# Patient Record
Sex: FEMALE | Race: WHITE | NOT HISPANIC OR LATINO | Employment: FULL TIME | URBAN - METROPOLITAN AREA
[De-identification: names, ages, dates, MRNs, and addresses within clinical notes are randomized per-mention and may not be internally consistent; named-entity substitution may affect disease eponyms.]

---

## 2018-09-13 ENCOUNTER — APPOINTMENT (OUTPATIENT)
Dept: WOMENS IMAGING | Facility: HOSPITAL | Age: 43
End: 2018-09-13

## 2018-09-13 PROCEDURE — 76641 ULTRASOUND BREAST COMPLETE: CPT | Performed by: RADIOLOGY

## 2020-10-07 ENCOUNTER — OFFICE VISIT (OUTPATIENT)
Dept: SURGERY | Facility: CLINIC | Age: 45
End: 2020-10-07

## 2020-10-07 VITALS — HEART RATE: 75 BPM | SYSTOLIC BLOOD PRESSURE: 136 MMHG | DIASTOLIC BLOOD PRESSURE: 94 MMHG | WEIGHT: 178 LBS

## 2020-10-07 DIAGNOSIS — D24.1 FIBROADENOMA OF RIGHT BREAST: Primary | ICD-10-CM

## 2020-10-07 DIAGNOSIS — Z01.818 PREOP TESTING: Primary | ICD-10-CM

## 2020-10-07 PROCEDURE — 99203 OFFICE O/P NEW LOW 30 MIN: CPT | Performed by: SURGERY

## 2020-10-07 RX ORDER — FENOFIBRATE 145 MG/1
145 TABLET, COATED ORAL DAILY
COMMUNITY
Start: 2020-09-30

## 2020-10-07 RX ORDER — GABAPENTIN 300 MG/1
CAPSULE ORAL
COMMUNITY
Start: 2020-09-30

## 2020-10-07 RX ORDER — IRBESARTAN 150 MG/1
150 TABLET ORAL DAILY
COMMUNITY
Start: 2020-09-30

## 2020-10-07 RX ORDER — LEVOTHYROXINE SODIUM 0.07 MG/1
125 TABLET ORAL DAILY
COMMUNITY
Start: 2020-09-30

## 2020-10-07 RX ORDER — SEMAGLUTIDE 1.34 MG/ML
1 INJECTION, SOLUTION SUBCUTANEOUS WEEKLY
COMMUNITY
Start: 2020-10-02 | End: 2023-03-15

## 2020-10-07 RX ORDER — IRON POLYSACCHARIDE COMPLEX 150 MG
150 CAPSULE ORAL DAILY PRN
COMMUNITY
Start: 2020-07-28

## 2020-10-07 RX ORDER — ERGOCALCIFEROL 1.25 MG/1
50000 CAPSULE ORAL DAILY
COMMUNITY

## 2020-10-07 RX ORDER — EMPAGLIFLOZIN 25 MG/1
1 TABLET, FILM COATED ORAL DAILY
COMMUNITY
Start: 2020-09-30

## 2020-10-07 NOTE — PROGRESS NOTES
Subjective   Faby Avelar is a 44 y.o. female is being seen for consultation today at the request of Provider, No Known    Faby Avelar is a 44 y.o. female With biopsy-proven fibroadenoma the noninterventional imaging has increased in size from 1.3 to 1.9 cm.  Due to increase in size and discomfort she would like to undergo excisional biopsy.  No findings concerning for conversion or new mass.  The patient has no nipple discharge or other abnormality.  No previous pregnancies.  No evidence of early menarche and she is premenopausal.  No hormone therapy reported.      Past Medical History:   Diagnosis Date   • Diabetes mellitus (CMS/HCC)    • Hypertension    • Low iron    • Thyroid activity decreased        Family History   Problem Relation Age of Onset   • COPD Mother    • Hypertension Mother    • Heart disease Father    • Diabetes Father    • Hyperlipidemia Father        Social History     Socioeconomic History   • Marital status: Unknown     Spouse name: Not on file   • Number of children: Not on file   • Years of education: Not on file   • Highest education level: Not on file   Tobacco Use   • Smoking status: Never Smoker   • Smokeless tobacco: Never Used   Substance and Sexual Activity   • Alcohol use: Never     Frequency: Never   • Drug use: Never       Past Surgical History:   Procedure Laterality Date   • GALLBLADDER SURGERY     • WRIST SURGERY         Review of Systems   Constitutional: Negative for activity change, appetite change, chills and fever.   HENT: Negative for sore throat and trouble swallowing.    Eyes: Negative for visual disturbance.   Respiratory: Negative for cough and shortness of breath.    Cardiovascular: Negative for chest pain and palpitations.   Gastrointestinal: Negative for abdominal distention, abdominal pain, blood in stool, constipation, diarrhea, nausea and vomiting.   Endocrine: Negative for cold intolerance and heat intolerance.   Genitourinary: Negative for dysuria.    Musculoskeletal: Negative for joint swelling.   Skin: Negative for color change, rash and wound.   Allergic/Immunologic: Negative for immunocompromised state.   Neurological: Negative for dizziness, seizures, weakness and headaches.   Hematological: Negative for adenopathy. Does not bruise/bleed easily.   Psychiatric/Behavioral: Negative for agitation and confusion.         /94   Pulse 75   Wt 80.7 kg (178 lb)   Objective   Physical Exam  Vitals signs reviewed.   Constitutional:       Appearance: She is well-developed.   HENT:      Head: Normocephalic and atraumatic.   Eyes:      General: No scleral icterus.     Conjunctiva/sclera: Conjunctivae normal.      Pupils: Pupils are equal, round, and reactive to light.   Neck:      Musculoskeletal: Neck supple.      Thyroid: No thyromegaly.      Vascular: No JVD.      Trachea: No tracheal deviation.   Cardiovascular:      Rate and Rhythm: Normal rate and regular rhythm.      Heart sounds: No murmur. No friction rub. No gallop.    Pulmonary:      Effort: Pulmonary effort is normal. No retractions.      Breath sounds: Normal breath sounds.   Chest:      Chest wall: No tenderness.   Abdominal:      General: Bowel sounds are normal. There is no distension.      Palpations: Abdomen is soft. There is no hepatomegaly, splenomegaly or mass.      Tenderness: There is no abdominal tenderness.      Hernia: No hernia is present.   Musculoskeletal: Normal range of motion.         General: No deformity.   Lymphadenopathy:      Cervical: No cervical adenopathy.   Skin:     General: Skin is warm and dry.      Findings: No rash.   Neurological:      Mental Status: She is alert and oriented to person, place, and time.               Assessment   Faby was seen today for breast mass.    Diagnoses and all orders for this visit:    Fibroadenoma of right breast  -     Case Request; Standing  -     Mammo Breast Placement Device Initial Without Biopsy Right; Future  -     Case  Request    Other orders  -     SCANNED - IMAGING  -     SCANNED - IMAGING  -     SCANNED - IMAGING  -     SCANNED - IMAGING  -     SCANNED - IMAGING  -     SCANNED - IMAGING  -     SCANNED - IMAGING  -     SCANNED - IMAGING  -     SCANNED - IMAGING  -     Follow anesthesia standing orders.  -     Provide NPO Instructions to Patient; Future  -     Chlorhexidine Skin Prep; Future      Faby Avelar is a 44 y.o. female with growing fibroadenoma of the right breast causing pain.  Risk and benefits have been discussed with the patient and she will proceed with excisional biopsy after wire localization.    Patient's There is no height or weight on file to calculate BMI. BMI is above normal parameters. Recommendations include: educational material.

## 2020-10-27 ENCOUNTER — APPOINTMENT (OUTPATIENT)
Dept: PREADMISSION TESTING | Facility: HOSPITAL | Age: 45
End: 2020-10-27

## 2020-10-27 ENCOUNTER — LAB (OUTPATIENT)
Dept: LAB | Facility: HOSPITAL | Age: 45
End: 2020-10-27

## 2020-10-27 DIAGNOSIS — Z01.818 PREOP TESTING: ICD-10-CM

## 2020-10-27 LAB
ANION GAP SERPL CALCULATED.3IONS-SCNC: 10.6 MMOL/L (ref 5–15)
BUN SERPL-MCNC: 14 MG/DL (ref 6–20)
BUN/CREAT SERPL: 18.2 (ref 7–25)
CALCIUM SPEC-SCNC: 9.6 MG/DL (ref 8.6–10.5)
CHLORIDE SERPL-SCNC: 104 MMOL/L (ref 98–107)
CO2 SERPL-SCNC: 24.4 MMOL/L (ref 22–29)
CREAT SERPL-MCNC: 0.77 MG/DL (ref 0.57–1)
DEPRECATED RDW RBC AUTO: 42.5 FL (ref 37–54)
ERYTHROCYTE [DISTWIDTH] IN BLOOD BY AUTOMATED COUNT: 16.1 % (ref 12.3–15.4)
GFR SERPL CREATININE-BSD FRML MDRD: 81 ML/MIN/1.73
GLUCOSE SERPL-MCNC: 100 MG/DL (ref 65–99)
HCT VFR BLD AUTO: 38.1 % (ref 34–46.6)
HGB BLD-MCNC: 11.1 G/DL (ref 12–15.9)
MCH RBC QN AUTO: 21.3 PG (ref 26.6–33)
MCHC RBC AUTO-ENTMCNC: 29.1 G/DL (ref 31.5–35.7)
MCV RBC AUTO: 73.3 FL (ref 79–97)
PLATELET # BLD AUTO: 340 10*3/MM3 (ref 140–450)
PMV BLD AUTO: 10.7 FL (ref 6–12)
POTASSIUM SERPL-SCNC: 4.4 MMOL/L (ref 3.5–5.2)
RBC # BLD AUTO: 5.2 10*6/MM3 (ref 3.77–5.28)
SODIUM SERPL-SCNC: 139 MMOL/L (ref 136–145)
WBC # BLD AUTO: 7.7 10*3/MM3 (ref 3.4–10.8)

## 2020-10-27 PROCEDURE — U0004 COV-19 TEST NON-CDC HGH THRU: HCPCS | Performed by: SURGERY

## 2020-10-27 PROCEDURE — C9803 HOPD COVID-19 SPEC COLLECT: HCPCS

## 2020-10-27 PROCEDURE — 36415 COLL VENOUS BLD VENIPUNCTURE: CPT

## 2020-10-27 PROCEDURE — 85027 COMPLETE CBC AUTOMATED: CPT | Performed by: ANESTHESIOLOGY

## 2020-10-27 PROCEDURE — 80048 BASIC METABOLIC PNL TOTAL CA: CPT | Performed by: ANESTHESIOLOGY

## 2020-10-28 LAB — SARS-COV-2 RNA RESP QL NAA+PROBE: NOT DETECTED

## 2020-10-29 ENCOUNTER — HOSPITAL ENCOUNTER (OUTPATIENT)
Dept: ULTRASOUND IMAGING | Facility: HOSPITAL | Age: 45
Setting detail: HOSPITAL OUTPATIENT SURGERY
Discharge: HOME | End: 2020-10-29

## 2020-10-29 ENCOUNTER — APPOINTMENT (OUTPATIENT)
Dept: MAMMOGRAPHY | Facility: HOSPITAL | Age: 45
End: 2020-10-29

## 2020-10-29 ENCOUNTER — HOSPITAL ENCOUNTER (OUTPATIENT)
Facility: HOSPITAL | Age: 45
Setting detail: HOSPITAL OUTPATIENT SURGERY
Discharge: HOME | End: 2020-10-29
Attending: SURGERY | Admitting: SURGERY

## 2020-10-29 ENCOUNTER — ANESTHESIA (OUTPATIENT)
Dept: PERIOP | Facility: HOSPITAL | Age: 45
End: 2020-10-29

## 2020-10-29 ENCOUNTER — ANESTHESIA EVENT (OUTPATIENT)
Dept: PERIOP | Facility: HOSPITAL | Age: 45
End: 2020-10-29

## 2020-10-29 VITALS
RESPIRATION RATE: 18 BRPM | WEIGHT: 179 LBS | BODY MASS INDEX: 31.71 KG/M2 | OXYGEN SATURATION: 100 % | TEMPERATURE: 97.3 F | HEART RATE: 76 BPM | HEIGHT: 63 IN | SYSTOLIC BLOOD PRESSURE: 122 MMHG | DIASTOLIC BLOOD PRESSURE: 73 MMHG

## 2020-10-29 DIAGNOSIS — R92.8 ABNORMAL MAMMOGRAM: ICD-10-CM

## 2020-10-29 DIAGNOSIS — D24.1 FIBROADENOMA OF RIGHT BREAST: ICD-10-CM

## 2020-10-29 LAB
B-HCG UR QL: NEGATIVE
GLUCOSE BLDC GLUCOMTR-MCNC: 93 MG/DL (ref 70–130)
INTERNAL NEGATIVE CONTROL: NEGATIVE
INTERNAL POSITIVE CONTROL: POSITIVE
Lab: NORMAL

## 2020-10-29 PROCEDURE — 25010000002 PROPOFOL 10 MG/ML EMULSION: Performed by: NURSE ANESTHETIST, CERTIFIED REGISTERED

## 2020-10-29 PROCEDURE — 25010000002 MIDAZOLAM PER 1 MG: Performed by: NURSE ANESTHETIST, CERTIFIED REGISTERED

## 2020-10-29 PROCEDURE — 25010000002 ONDANSETRON PER 1 MG: Performed by: NURSE ANESTHETIST, CERTIFIED REGISTERED

## 2020-10-29 PROCEDURE — 25010000002 KETOROLAC TROMETHAMINE PER 15 MG: Performed by: NURSE ANESTHETIST, CERTIFIED REGISTERED

## 2020-10-29 PROCEDURE — 77065 DX MAMMO INCL CAD UNI: CPT | Performed by: RADIOLOGY

## 2020-10-29 PROCEDURE — 19125 EXCISION BREAST LESION: CPT | Performed by: SURGERY

## 2020-10-29 PROCEDURE — 81025 URINE PREGNANCY TEST: CPT | Performed by: ANESTHESIOLOGY

## 2020-10-29 PROCEDURE — 82962 GLUCOSE BLOOD TEST: CPT

## 2020-10-29 PROCEDURE — 25010000002 DEXAMETHASONE PER 1 MG: Performed by: NURSE ANESTHETIST, CERTIFIED REGISTERED

## 2020-10-29 PROCEDURE — 19285 PERQ DEV BREAST 1ST US IMAG: CPT | Performed by: RADIOLOGY

## 2020-10-29 PROCEDURE — 25010000002 FENTANYL CITRATE (PF) 100 MCG/2ML SOLUTION: Performed by: NURSE ANESTHETIST, CERTIFIED REGISTERED

## 2020-10-29 RX ORDER — MIDAZOLAM HYDROCHLORIDE 1 MG/ML
INJECTION INTRAMUSCULAR; INTRAVENOUS AS NEEDED
Status: DISCONTINUED | OUTPATIENT
Start: 2020-10-29 | End: 2020-10-29 | Stop reason: SURG

## 2020-10-29 RX ORDER — ONDANSETRON 2 MG/ML
INJECTION INTRAMUSCULAR; INTRAVENOUS AS NEEDED
Status: DISCONTINUED | OUTPATIENT
Start: 2020-10-29 | End: 2020-10-29 | Stop reason: SURG

## 2020-10-29 RX ORDER — MAGNESIUM HYDROXIDE 1200 MG/15ML
LIQUID ORAL AS NEEDED
Status: DISCONTINUED | OUTPATIENT
Start: 2020-10-29 | End: 2020-10-29 | Stop reason: HOSPADM

## 2020-10-29 RX ORDER — SODIUM CHLORIDE 0.9 % (FLUSH) 0.9 %
10 SYRINGE (ML) INJECTION EVERY 12 HOURS SCHEDULED
Status: DISCONTINUED | OUTPATIENT
Start: 2020-10-29 | End: 2020-10-29 | Stop reason: HOSPADM

## 2020-10-29 RX ORDER — BUPIVACAINE HYDROCHLORIDE AND EPINEPHRINE 2.5; 5 MG/ML; UG/ML
INJECTION, SOLUTION EPIDURAL; INFILTRATION; INTRACAUDAL; PERINEURAL AS NEEDED
Status: DISCONTINUED | OUTPATIENT
Start: 2020-10-29 | End: 2020-10-29 | Stop reason: HOSPADM

## 2020-10-29 RX ORDER — ACETAMINOPHEN 325 MG/1
650 TABLET ORAL EVERY 4 HOURS PRN
Qty: 30 TABLET | Refills: 0 | Status: SHIPPED | OUTPATIENT
Start: 2020-10-29

## 2020-10-29 RX ORDER — FAMOTIDINE 10 MG/ML
INJECTION, SOLUTION INTRAVENOUS AS NEEDED
Status: DISCONTINUED | OUTPATIENT
Start: 2020-10-29 | End: 2020-10-29 | Stop reason: SURG

## 2020-10-29 RX ORDER — DROPERIDOL 2.5 MG/ML
0.62 INJECTION, SOLUTION INTRAMUSCULAR; INTRAVENOUS ONCE AS NEEDED
Status: DISCONTINUED | OUTPATIENT
Start: 2020-10-29 | End: 2020-10-29 | Stop reason: HOSPADM

## 2020-10-29 RX ORDER — SODIUM CHLORIDE, SODIUM LACTATE, POTASSIUM CHLORIDE, CALCIUM CHLORIDE 600; 310; 30; 20 MG/100ML; MG/100ML; MG/100ML; MG/100ML
125 INJECTION, SOLUTION INTRAVENOUS CONTINUOUS
Status: DISCONTINUED | OUTPATIENT
Start: 2020-10-29 | End: 2020-10-29 | Stop reason: HOSPADM

## 2020-10-29 RX ORDER — PROPOFOL 10 MG/ML
VIAL (ML) INTRAVENOUS AS NEEDED
Status: DISCONTINUED | OUTPATIENT
Start: 2020-10-29 | End: 2020-10-29 | Stop reason: SURG

## 2020-10-29 RX ORDER — SODIUM CHLORIDE, SODIUM LACTATE, POTASSIUM CHLORIDE, CALCIUM CHLORIDE 600; 310; 30; 20 MG/100ML; MG/100ML; MG/100ML; MG/100ML
100 INJECTION, SOLUTION INTRAVENOUS ONCE AS NEEDED
Status: DISCONTINUED | OUTPATIENT
Start: 2020-10-29 | End: 2020-10-29 | Stop reason: HOSPADM

## 2020-10-29 RX ORDER — MIDAZOLAM HYDROCHLORIDE 1 MG/ML
1 INJECTION INTRAMUSCULAR; INTRAVENOUS
Status: DISCONTINUED | OUTPATIENT
Start: 2020-10-29 | End: 2020-10-29 | Stop reason: HOSPADM

## 2020-10-29 RX ORDER — OXYCODONE HYDROCHLORIDE AND ACETAMINOPHEN 5; 325 MG/1; MG/1
1 TABLET ORAL ONCE AS NEEDED
Status: COMPLETED | OUTPATIENT
Start: 2020-10-29 | End: 2020-10-29

## 2020-10-29 RX ORDER — KETOROLAC TROMETHAMINE 30 MG/ML
INJECTION, SOLUTION INTRAMUSCULAR; INTRAVENOUS AS NEEDED
Status: DISCONTINUED | OUTPATIENT
Start: 2020-10-29 | End: 2020-10-29 | Stop reason: SURG

## 2020-10-29 RX ORDER — FENTANYL CITRATE 50 UG/ML
50 INJECTION, SOLUTION INTRAMUSCULAR; INTRAVENOUS
Status: DISCONTINUED | OUTPATIENT
Start: 2020-10-29 | End: 2020-10-29 | Stop reason: HOSPADM

## 2020-10-29 RX ORDER — IBUPROFEN 600 MG/1
600 TABLET ORAL EVERY 6 HOURS PRN
Qty: 30 TABLET | Refills: 0 | Status: SHIPPED | OUTPATIENT
Start: 2020-10-29

## 2020-10-29 RX ORDER — LIDOCAINE HYDROCHLORIDE 20 MG/ML
INJECTION, SOLUTION INFILTRATION; PERINEURAL AS NEEDED
Status: DISCONTINUED | OUTPATIENT
Start: 2020-10-29 | End: 2020-10-29 | Stop reason: SURG

## 2020-10-29 RX ORDER — TRAMADOL HYDROCHLORIDE 50 MG/1
50 TABLET ORAL EVERY 8 HOURS PRN
Qty: 6 TABLET | Refills: 0 | Status: SHIPPED | OUTPATIENT
Start: 2020-10-29

## 2020-10-29 RX ORDER — IPRATROPIUM BROMIDE AND ALBUTEROL SULFATE 2.5; .5 MG/3ML; MG/3ML
3 SOLUTION RESPIRATORY (INHALATION) ONCE AS NEEDED
Status: DISCONTINUED | OUTPATIENT
Start: 2020-10-29 | End: 2020-10-29 | Stop reason: HOSPADM

## 2020-10-29 RX ORDER — FENTANYL CITRATE 50 UG/ML
INJECTION, SOLUTION INTRAMUSCULAR; INTRAVENOUS AS NEEDED
Status: DISCONTINUED | OUTPATIENT
Start: 2020-10-29 | End: 2020-10-29 | Stop reason: SURG

## 2020-10-29 RX ORDER — DEXAMETHASONE SODIUM PHOSPHATE 4 MG/ML
INJECTION, SOLUTION INTRA-ARTICULAR; INTRALESIONAL; INTRAMUSCULAR; INTRAVENOUS; SOFT TISSUE AS NEEDED
Status: DISCONTINUED | OUTPATIENT
Start: 2020-10-29 | End: 2020-10-29 | Stop reason: SURG

## 2020-10-29 RX ORDER — MEPERIDINE HYDROCHLORIDE 25 MG/ML
12.5 INJECTION INTRAMUSCULAR; INTRAVENOUS; SUBCUTANEOUS
Status: DISCONTINUED | OUTPATIENT
Start: 2020-10-29 | End: 2020-10-29 | Stop reason: HOSPADM

## 2020-10-29 RX ORDER — SODIUM CHLORIDE 0.9 % (FLUSH) 0.9 %
10 SYRINGE (ML) INJECTION AS NEEDED
Status: DISCONTINUED | OUTPATIENT
Start: 2020-10-29 | End: 2020-10-29 | Stop reason: HOSPADM

## 2020-10-29 RX ORDER — ONDANSETRON 2 MG/ML
4 INJECTION INTRAMUSCULAR; INTRAVENOUS AS NEEDED
Status: DISCONTINUED | OUTPATIENT
Start: 2020-10-29 | End: 2020-10-29 | Stop reason: HOSPADM

## 2020-10-29 RX ADMIN — CEFAZOLIN 1 G: 1 INJECTION, POWDER, FOR SOLUTION INTRAMUSCULAR; INTRAVENOUS; PARENTERAL at 11:05

## 2020-10-29 RX ADMIN — OXYCODONE HYDROCHLORIDE AND ACETAMINOPHEN 1 TABLET: 5; 325 TABLET ORAL at 12:25

## 2020-10-29 RX ADMIN — KETOROLAC TROMETHAMINE 30 MG: 30 INJECTION, SOLUTION INTRAMUSCULAR; INTRAVENOUS at 11:12

## 2020-10-29 RX ADMIN — DEXAMETHASONE SODIUM PHOSPHATE 8 MG: 4 INJECTION, SOLUTION INTRAMUSCULAR; INTRAVENOUS at 11:12

## 2020-10-29 RX ADMIN — FENTANYL CITRATE 50 MCG: 50 INJECTION INTRAMUSCULAR; INTRAVENOUS at 11:05

## 2020-10-29 RX ADMIN — FAMOTIDINE 20 MG: 10 INJECTION INTRAVENOUS at 11:05

## 2020-10-29 RX ADMIN — PROPOFOL 130 MG: 10 INJECTION, EMULSION INTRAVENOUS at 11:10

## 2020-10-29 RX ADMIN — ONDANSETRON 4 MG: 2 INJECTION INTRAMUSCULAR; INTRAVENOUS at 11:05

## 2020-10-29 RX ADMIN — SODIUM CHLORIDE, POTASSIUM CHLORIDE, SODIUM LACTATE AND CALCIUM CHLORIDE 125 ML/HR: 600; 310; 30; 20 INJECTION, SOLUTION INTRAVENOUS at 09:54

## 2020-10-29 RX ADMIN — MIDAZOLAM HYDROCHLORIDE 2 MG: 1 INJECTION, SOLUTION INTRAMUSCULAR; INTRAVENOUS at 11:05

## 2020-10-29 RX ADMIN — LIDOCAINE HYDROCHLORIDE 40 MG: 20 INJECTION, SOLUTION INFILTRATION; PERINEURAL at 11:10

## 2020-10-29 RX ADMIN — FENTANYL CITRATE 50 MCG: 50 INJECTION INTRAMUSCULAR; INTRAVENOUS at 11:40

## 2020-10-29 NOTE — ANESTHESIA PROCEDURE NOTES
Airway  Urgency: elective    Date/Time: 10/29/2020 11:10 AM  Airway not difficult    General Information and Staff    Patient location during procedure: OR  CRNA: Miesha Mckeon CRNA    Indications and Patient Condition  Indications for airway management: airway protection    Preoxygenated: yes  MILS maintained throughout  Mask difficulty assessment: 0 - not attempted    Final Airway Details  Final airway type: supraglottic airway      Successful airway: unique  Size 4    Number of attempts at approach: 1  Assessment: lips, teeth, and gum same as pre-op

## 2020-10-29 NOTE — ANESTHESIA PREPROCEDURE EVALUATION
Anesthesia Evaluation     Patient summary reviewed and Nursing notes reviewed   no history of anesthetic complications:  NPO Solid Status: > 8 hours  NPO Liquid Status: > 8 hours           Airway   Mallampati: II  TM distance: >3 FB  Neck ROM: full  No difficulty expected  Dental - normal exam     Pulmonary - negative pulmonary ROS and normal exam    breath sounds clear to auscultation  Cardiovascular - normal exam    Rhythm: regular  Rate: normal    (+) hypertension,       Neuro/Psych- negative ROS  GI/Hepatic/Renal/Endo    (+) obesity,   diabetes mellitus, thyroid problem hypothyroidism    Musculoskeletal (-) negative ROS    Abdominal   (+) obese,    Substance History - negative use     OB/GYN negative ob/gyn ROS         Other - negative ROS                     Anesthesia Plan    ASA 2     general     intravenous induction     Anesthetic plan, all risks, benefits, and alternatives have been provided, discussed and informed consent has been obtained with: patient.    Plan discussed with CRNA.

## 2020-10-29 NOTE — ANESTHESIA POSTPROCEDURE EVALUATION
Patient: Faby Avelar    Procedure Summary     Date: 10/29/20 Room / Location: [facility] OR  /  [facility] OR    Anesthesia Start: 1105 Anesthesia Stop: 1145    Procedure: BREAST BIOPSY WITH NEEDLE LOCALIZATION (Right Breast) Diagnosis:       Fibroadenoma of right breast      (Fibroadenoma of right breast [D24.1])    Surgeon: Dao Cano MD Provider: Shahriar Mcdonald DO    Anesthesia Type: general ASA Status: 2          Anesthesia Type: general    Vitals  Vitals Value Taken Time   /70 10/29/20 1216   Temp 97.1 °F (36.2 °C) 10/29/20 1146   Pulse 75 10/29/20 1216   Resp 11 10/29/20 1216   SpO2 97 % 10/29/20 1216           Post Anesthesia Care and Evaluation    Patient location during evaluation: PHASE II  Patient participation: complete - patient participated  Level of consciousness: awake and alert  Pain score: 0  Pain management: adequate  Airway patency: patent  Anesthetic complications: No anesthetic complications  PONV Status: controlled  Cardiovascular status: acceptable  Respiratory status: acceptable and room air  Hydration status: euvolemic  No anesthesia care post op

## 2020-11-02 LAB
LAB AP CASE REPORT: NORMAL
PATH REPORT.FINAL DX SPEC: NORMAL

## 2020-11-11 ENCOUNTER — OFFICE VISIT (OUTPATIENT)
Dept: SURGERY | Facility: CLINIC | Age: 45
End: 2020-11-11

## 2020-11-11 VITALS — WEIGHT: 179 LBS | BODY MASS INDEX: 31.71 KG/M2 | HEIGHT: 63 IN

## 2020-11-11 DIAGNOSIS — D24.1 FIBROADENOMA OF RIGHT BREAST: Primary | ICD-10-CM

## 2020-11-11 PROCEDURE — 99024 POSTOP FOLLOW-UP VISIT: CPT | Performed by: SURGERY

## 2020-11-11 NOTE — PROGRESS NOTES
Subjective   Faby Avelar is a 45 y.o. female  is here today for follow-up.         Faby Avelar is a 45 y.o. female here for follow up after right breast lumpectomy.  Final pathology is consistent with known fibroadenoma.  No evidence of atypia or malignancy.  Her incision is healing well without issue.    Assessment     Diagnoses and all orders for this visit:    1. Fibroadenoma of right breast (Primary)      Faby Avelar is a 45 y.o. female status post right breast fibroadenoma excision.  The patient is doing well and pathology is consistent with benign disease.  Repeat baseline mammogram in 6 months and follow-up as needed.

## 2021-02-05 ENCOUNTER — IMMUNIZATION (OUTPATIENT)
Dept: VACCINE CLINIC | Facility: HOSPITAL | Age: 46
End: 2021-02-05

## 2021-02-05 PROCEDURE — 91300 HC SARSCOV02 VAC 30MCG/0.3ML IM: CPT | Performed by: INTERNAL MEDICINE

## 2021-02-05 PROCEDURE — 0001A: CPT | Performed by: INTERNAL MEDICINE

## 2021-02-26 ENCOUNTER — IMMUNIZATION (OUTPATIENT)
Dept: VACCINE CLINIC | Facility: HOSPITAL | Age: 46
End: 2021-02-26

## 2021-02-26 PROCEDURE — 0002A: CPT | Performed by: INTERNAL MEDICINE

## 2021-02-26 PROCEDURE — 91300 HC SARSCOV02 VAC 30MCG/0.3ML IM: CPT | Performed by: INTERNAL MEDICINE

## 2021-05-03 ENCOUNTER — DOCUMENTATION (OUTPATIENT)
Dept: ONCOLOGY | Facility: CLINIC | Age: 46
End: 2021-05-03

## 2021-05-03 ENCOUNTER — CONSULT (OUTPATIENT)
Dept: ONCOLOGY | Facility: CLINIC | Age: 46
End: 2021-05-03

## 2021-05-03 VITALS
SYSTOLIC BLOOD PRESSURE: 121 MMHG | DIASTOLIC BLOOD PRESSURE: 78 MMHG | HEART RATE: 73 BPM | RESPIRATION RATE: 18 BRPM | WEIGHT: 175 LBS | HEIGHT: 64 IN | OXYGEN SATURATION: 99 % | TEMPERATURE: 97.5 F | BODY MASS INDEX: 29.88 KG/M2

## 2021-05-03 DIAGNOSIS — D50.9 IRON DEFICIENCY ANEMIA, UNSPECIFIED IRON DEFICIENCY ANEMIA TYPE: Primary | ICD-10-CM

## 2021-05-03 DIAGNOSIS — K90.9 INTESTINAL MALABSORPTION, UNSPECIFIED TYPE: ICD-10-CM

## 2021-05-03 LAB
ALBUMIN SERPL-MCNC: 4.63 G/DL (ref 3.5–5.2)
ALBUMIN/GLOB SERPL: 1.3 G/DL
ALP SERPL-CCNC: 34 U/L (ref 39–117)
ALT SERPL W P-5'-P-CCNC: 23 U/L (ref 1–33)
ANION GAP SERPL CALCULATED.3IONS-SCNC: 12.4 MMOL/L (ref 5–15)
AST SERPL-CCNC: 24 U/L (ref 1–32)
BASOPHILS # BLD AUTO: 0.04 10*3/MM3 (ref 0–0.2)
BASOPHILS NFR BLD AUTO: 0.7 % (ref 0–1.5)
BILIRUB SERPL-MCNC: 0.3 MG/DL (ref 0–1.2)
BUN SERPL-MCNC: 11 MG/DL (ref 6–20)
BUN/CREAT SERPL: 12.5 (ref 7–25)
CALCIUM SPEC-SCNC: 9.7 MG/DL (ref 8.6–10.5)
CHLORIDE SERPL-SCNC: 104 MMOL/L (ref 98–107)
CO2 SERPL-SCNC: 23.6 MMOL/L (ref 22–29)
CREAT SERPL-MCNC: 0.88 MG/DL (ref 0.57–1)
CRP SERPL-MCNC: 0.35 MG/DL (ref 0–0.5)
DEPRECATED RDW RBC AUTO: 42.3 FL (ref 37–54)
EOSINOPHIL # BLD AUTO: 0.12 10*3/MM3 (ref 0–0.4)
EOSINOPHIL NFR BLD AUTO: 2.2 % (ref 0.3–6.2)
ERYTHROCYTE [DISTWIDTH] IN BLOOD BY AUTOMATED COUNT: 15.6 % (ref 12.3–15.4)
ERYTHROCYTE [SEDIMENTATION RATE] IN BLOOD: 8 MM/HR (ref 0–20)
FERRITIN SERPL-MCNC: 11.7 NG/ML (ref 13–150)
GFR SERPL CREATININE-BSD FRML MDRD: 70 ML/MIN/1.73
GLOBULIN UR ELPH-MCNC: 3.5 GM/DL
GLUCOSE SERPL-MCNC: 81 MG/DL (ref 65–99)
HCT VFR BLD AUTO: 36.7 % (ref 34–46.6)
HGB BLD-MCNC: 10.7 G/DL (ref 12–15.9)
IMM GRANULOCYTES # BLD AUTO: 0.01 10*3/MM3 (ref 0–0.05)
IMM GRANULOCYTES NFR BLD AUTO: 0.2 % (ref 0–0.5)
IRON 24H UR-MRATE: 44 MCG/DL (ref 37–145)
IRON SATN MFR SERPL: 7 % (ref 20–50)
LDH SERPL-CCNC: 141 U/L (ref 135–214)
LYMPHOCYTES # BLD AUTO: 1.92 10*3/MM3 (ref 0.7–3.1)
LYMPHOCYTES NFR BLD AUTO: 34.9 % (ref 19.6–45.3)
MCH RBC QN AUTO: 21.8 PG (ref 26.6–33)
MCHC RBC AUTO-ENTMCNC: 29.2 G/DL (ref 31.5–35.7)
MCV RBC AUTO: 74.9 FL (ref 79–97)
MONOCYTES # BLD AUTO: 0.33 10*3/MM3 (ref 0.1–0.9)
MONOCYTES NFR BLD AUTO: 6 % (ref 5–12)
NEUTROPHILS NFR BLD AUTO: 3.08 10*3/MM3 (ref 1.7–7)
NEUTROPHILS NFR BLD AUTO: 56 % (ref 42.7–76)
NRBC BLD AUTO-RTO: 0 /100 WBC (ref 0–0.2)
PLATELET # BLD AUTO: 440 10*3/MM3 (ref 140–450)
PMV BLD AUTO: 10.2 FL (ref 6–12)
POTASSIUM SERPL-SCNC: 4.3 MMOL/L (ref 3.5–5.2)
PROT SERPL-MCNC: 8.1 G/DL (ref 6–8.5)
RBC # BLD AUTO: 4.9 10*6/MM3 (ref 3.77–5.28)
RETICS # AUTO: 0.04 10*6/MM3 (ref 0.02–0.13)
RETICS/RBC NFR AUTO: 0.9 % (ref 0.7–1.9)
SODIUM SERPL-SCNC: 140 MMOL/L (ref 136–145)
TIBC SERPL-MCNC: 660 MCG/DL (ref 298–536)
TRANSFERRIN SERPL-MCNC: 443 MG/DL (ref 200–360)
WBC # BLD AUTO: 5.5 10*3/MM3 (ref 3.4–10.8)

## 2021-05-03 PROCEDURE — 83010 ASSAY OF HAPTOGLOBIN QUANT: CPT | Performed by: NURSE PRACTITIONER

## 2021-05-03 PROCEDURE — 82746 ASSAY OF FOLIC ACID SERUM: CPT | Performed by: NURSE PRACTITIONER

## 2021-05-03 PROCEDURE — 83516 IMMUNOASSAY NONANTIBODY: CPT | Performed by: NURSE PRACTITIONER

## 2021-05-03 PROCEDURE — 82728 ASSAY OF FERRITIN: CPT | Performed by: NURSE PRACTITIONER

## 2021-05-03 PROCEDURE — 85652 RBC SED RATE AUTOMATED: CPT | Performed by: NURSE PRACTITIONER

## 2021-05-03 PROCEDURE — 85045 AUTOMATED RETICULOCYTE COUNT: CPT | Performed by: NURSE PRACTITIONER

## 2021-05-03 PROCEDURE — 85060 BLOOD SMEAR INTERPRETATION: CPT | Performed by: NURSE PRACTITIONER

## 2021-05-03 PROCEDURE — 84630 ASSAY OF ZINC: CPT | Performed by: NURSE PRACTITIONER

## 2021-05-03 PROCEDURE — 86140 C-REACTIVE PROTEIN: CPT | Performed by: NURSE PRACTITIONER

## 2021-05-03 PROCEDURE — 85025 COMPLETE CBC W/AUTO DIFF WBC: CPT | Performed by: NURSE PRACTITIONER

## 2021-05-03 PROCEDURE — 80053 COMPREHEN METABOLIC PANEL: CPT | Performed by: NURSE PRACTITIONER

## 2021-05-03 PROCEDURE — 84466 ASSAY OF TRANSFERRIN: CPT | Performed by: NURSE PRACTITIONER

## 2021-05-03 PROCEDURE — 82525 ASSAY OF COPPER: CPT | Performed by: NURSE PRACTITIONER

## 2021-05-03 PROCEDURE — 83540 ASSAY OF IRON: CPT | Performed by: NURSE PRACTITIONER

## 2021-05-03 PROCEDURE — 83615 LACTATE (LD) (LDH) ENZYME: CPT | Performed by: NURSE PRACTITIONER

## 2021-05-03 PROCEDURE — 82607 VITAMIN B-12: CPT | Performed by: NURSE PRACTITIONER

## 2021-05-03 PROCEDURE — 99243 OFF/OP CNSLTJ NEW/EST LOW 30: CPT | Performed by: NURSE PRACTITIONER

## 2021-05-03 RX ORDER — SODIUM CHLORIDE 9 MG/ML
250 INJECTION, SOLUTION INTRAVENOUS ONCE
Status: CANCELLED | OUTPATIENT
Start: 2021-06-04

## 2021-05-03 RX ORDER — SODIUM CHLORIDE 9 MG/ML
250 INJECTION, SOLUTION INTRAVENOUS ONCE
Status: CANCELLED | OUTPATIENT
Start: 2021-06-10

## 2021-05-03 NOTE — PROGRESS NOTES
Patient completed her PHQ depression screening.    PHQ-9 Total Score:  2    Patient seen in office visit this date by provider.    SS will follow.

## 2021-05-03 NOTE — PROGRESS NOTES
DATE OF CONSULTATION:  5/3/2021    REASON FOR REFERRAL: AURY    REFERRING PHYSICIAN:  Dorina Ball MD    CHIEF COMPLAINT:  Fatigue, occasional dizziness      HISTORY OF PRESENT ILLNESS:   Faby Avelar is a very pleasant 45 y.o. female who is being seen today at the request of Dorina Ball MD for evaluation and treatment of iron deficiency anemia. Ms. Avelar reports following with her PCP every 6 months with routine lab testing. Previous available CBCs were reviewed and noted microyctic anemia, Hg 11.5, in December 2020 and Hg 10.3 in April 2021. Her WBC and platelets were normal. There are no other CBCs available to review for comparison of trend. She reports that she has always struggled with AURY and has been on and off oral iron multiple times in the past. She reports today that she has been on oral iron for the last two years and has not been fully compliant due to abdominal pain, nausea and constipation caused by the supplement. She reports fatigue, shortness of breath on exertion and occasional dizziness. She reports that her periods are normal. However, she occasionally struggles with menorrhagia. She follows with GYN and has recommended annual exams. She otherwise, denies blood loss from any source, no melena, rectal bleeding or hematuria. She reports having EGD/colonoscopy in 2006 with Dr. Mak which was unremarkable. She denies history of blood transfusions. She denies any other specific complaints today.    PAST MEDICAL HISTORY:  Past Medical History:   Diagnosis Date   • Diabetes mellitus (CMS/HCC)    • Elevated cholesterol    • Heartburn    • Hypertension    • Low iron    • Thyroid activity decreased        PAST SURGICAL HISTORY:  Past Surgical History:   Procedure Laterality Date   • BREAST BIOPSY Right 10/29/2020    Procedure: BREAST BIOPSY WITH NEEDLE LOCALIZATION;  Surgeon: Dao Cano MD;  Location: [facility];  Service: General;  Laterality: Right;   • GALLBLADDER SURGERY      • WRIST SURGERY         FAMILY HISTORY:  Family History   Problem Relation Age of Onset   • COPD Mother    • Hypertension Mother    • Skin cancer Mother    • Heart disease Father    • Diabetes Father    • Hyperlipidemia Father        SOCIAL HISTORY:  Social History     Socioeconomic History   • Marital status:      Spouse name: Not on file   • Number of children: Not on file   • Years of education: Not on file   • Highest education level: Not on file   Tobacco Use   • Smoking status: Never Smoker   • Smokeless tobacco: Never Used   Vaping Use   • Vaping Use: Never used   Substance and Sexual Activity   • Alcohol use: Never   • Drug use: Never   • Sexual activity: Defer         MEDICATIONS:  The current medication list was reviewed in the EMR    Current Outpatient Medications:   •  fenofibrate (TRICOR) 145 MG tablet, Take 145 mg by mouth Daily., Disp: , Rfl:   •  gabapentin (NEURONTIN) 300 MG capsule, TAKE 1 (ONE) CAPSULE BY MOUTH AT BEDTIME, Disp: , Rfl:   •  irbesartan (AVAPRO) 150 MG tablet, Take 150 mg by mouth Daily., Disp: , Rfl:   •  levothyroxine (SYNTHROID, LEVOTHROID) 75 MCG tablet, Take 125 mcg by mouth Daily., Disp: , Rfl:   •  Ozempic, 1 MG/DOSE, 2 MG/1.5ML solution pen-injector, Inject 1 mg under the skin into the appropriate area as directed 1 (One) Time Per Week., Disp: , Rfl:   •  acetaminophen (TYLENOL) 325 MG tablet, Take 2 tablets by mouth Every 4 (Four) Hours As Needed for Mild Pain ., Disp: 30 tablet, Rfl: 0  •  Ferrex 150 150 MG capsule, Take 150 mg by mouth Daily As Needed., Disp: , Rfl:   •  ibuprofen (ADVIL,MOTRIN) 600 MG tablet, Take 1 tablet by mouth Every 6 (Six) Hours As Needed for Mild Pain ., Disp: 30 tablet, Rfl: 0  •  Jardiance 25 MG tablet, Take 1 tablet by mouth Daily., Disp: , Rfl:   •  traMADol (ULTRAM) 50 MG tablet, Take 1 tablet by mouth Every 8 (Eight) Hours As Needed for Moderate Pain ., Disp: 6 tablet, Rfl: 0  •  vitamin D (ERGOCALCIFEROL) 1.25 MG (69196 UT)  "capsule capsule, Take 50,000 Units by mouth Daily., Disp: , Rfl:     ALLERGIES:    Allergies   Allergen Reactions   • Mushroom Other (See Comments)     PT REPORTS THROAT TINGLES AND MOUTH GET NUMB     ..ECOG score: 0                 REVIEW OF SYSTEMS:    A comprehensive 14 point review of systems was performed.  Significant findings as mentioned above.  All other systems reviewed and are negative.        Physical Exam   Vital Signs: /78   Pulse 73   Temp 97.5 °F (36.4 °C) (Temporal)   Resp 18   Ht 162.6 cm (64\")   Wt 79.4 kg (175 lb)   SpO2 99%   BMI 30.04 kg/m²  Patient's Body mass index is 30.04 kg/m².     General: Well developed, well nourished, alert and oriented x 3, in no acute distress.   Head: ATNC   Eyes: PERRL, No evidence of conjunctivitis.   Nose: No nasal discharge.   Mouth: Oral mucosal membranes moist.   Neck: Neck supple. No thyromegaly. No JVD.   Lungs: Clear in all fields to A&P without rales, rhonchi or wheezing.   Heart: S1, S2. Regular rate and rhythm. No murmurs, rubs, or gallops.   Abdomen: Soft. Bowel sounds are normoactive. Nontender with palpation. No Hepatosplenomegaly can be appreciated.   Extremities: No cyanosis or edema.   Integumentary: Warm, dry, intact.  Neurologic: Grossly non-focal exam    Pain Score:  Pain Score    05/03/21 1100   PainSc: 0-No pain       PHQ-Score Total:  PHQ-9 Total Score: 2           IMAGING:      RECENT LABS:  Lab Results   Component Value Date    WBC 7.70 10/27/2020    HGB 11.1 (L) 10/27/2020    HCT 38.1 10/27/2020    MCV 73.3 (L) 10/27/2020    RDW 16.1 (H) 10/27/2020     10/27/2020    NEUTRORELPCT 61 01/29/2015    LYMPHORELPCT 31 01/29/2015    MONORELPCT 6 01/29/2015    EOSRELPCT 2 01/29/2015    BASORELPCT 0 01/29/2015    NEUTROABS 5.3 01/29/2015    LYMPHSABS 2.7 01/29/2015       Lab Results   Component Value Date     10/27/2020    K 4.4 10/27/2020    CO2 24.4 10/27/2020     10/27/2020    BUN 14 10/27/2020    CREATININE 0.77 " 10/27/2020    EGFRIFNONA 81 10/27/2020    EGFRIFAFRI 125 01/29/2015    GLUCOSE 100 (H) 10/27/2020    CALCIUM 9.6 10/27/2020    ALKPHOS 77 01/29/2015    AST 29 01/29/2015    ALT 36 (H) 01/29/2015    BILITOT 0.3 01/29/2015    ALBUMIN 4.5 01/29/2015    PROTEINTOT 7.5 01/16/2014           ASSESSMENT & PLAN:  Faby Avelar is a very pleasant 45 y.o. female with    1. Microcytic Anemia  2. AURY  - Previous available CBCs were reviewed and noted microyctic anemia, Hg 11.5, in December 2020 and Hg 10.3 in April 2021. Her WBC and platelets were normal. There are no other CBCs available to review for comparison of trend. She reports that she has always struggled with AURY and has been on and off oral iron multiple times in the past. She reports today that she has been on oral iron for the last two years and has not been fully compliant due to abdominal pain, nausea and constipation caused by the supplement.   - She reports that her periods are normal. However, she occasionally struggles with menorrhagia. She follows with GYN and has recommended annual exams.  - Denies blood loss from any source, no melena, rectal bleeding or hematuria. She reports having EGD/colonoscopy in 2006 with Dr. Mka which was unremarkable.   - She denies history of blood transfusions.  - Obtained repeat CBC today which showed Hg 10.7 which remains stable.   - Iron panel and ferritin are low and most consistent with iron deficiency anemia and anemia of chronic disease/inflammation. Therefore, will discontinue oral iron and replace with IV Feraheme, pending insurance approval for apparent malabsorption and intolerance of oral iron.   - To further evaluate anemia also sent additional labs today which are pending including PBS, B12, folate, Retic, LDH, Haptoglobin, ESR, CRP, copper, zinc and tissue transglutaminase.   - Will plan to follow up in 2 months with repeat labs. Recommended repeat GI evaluation. Patient would like to wait and discuss this with  her next follow up.    ACO / TY/Other  Quality measures  -  Faby Avelar received 2020 flu vaccine.  -  Faby Avelar reports a pain score of 0.   -  Current outpatient and discharge medications have been reconciled for the patient.  Reviewed by: JOHNY Ivan      The patient was in agreement with the plan and all questions were answered to her satisfaction.     Thank you so much for allowing us to participate in the care of Faby Avelar . Please do not hesitate to contact us with any questions or concerns.     A total of 45 minutes were spent coordinating this patient’s care in clinic today; more than 50% of this time was face-to-face with the patient, reviewing her interim medical history and counseling on the current treatment and followup plan. All questions were answered to her satisfaction.      Electronically Signed by: JOHNY Restrepo , May 3, 2021 11:18 EDT           Electronically Signed by: JOHNY Restrepo , May 3, 2021 11:18 EDT       CC:   MD Quinn Barrios, Dorina Raymundo MD

## 2021-05-04 DIAGNOSIS — D50.9 IRON DEFICIENCY ANEMIA, UNSPECIFIED IRON DEFICIENCY ANEMIA TYPE: Primary | ICD-10-CM

## 2021-05-04 DIAGNOSIS — K90.9 INTESTINAL MALABSORPTION, UNSPECIFIED TYPE: ICD-10-CM

## 2021-05-04 LAB
CYTOLOGIST CVX/VAG CYTO: NORMAL
FOLATE SERPL-MCNC: 5.01 NG/ML (ref 4.78–24.2)
HAPTOGLOB SERPL-MCNC: 120 MG/DL (ref 30–200)
PATH INTERP BLD-IMP: NORMAL
TTG IGA SER-ACNC: <2 U/ML (ref 0–3)
VIT B12 BLD-MCNC: 211 PG/ML (ref 211–946)

## 2021-05-04 RX ORDER — FOLIC ACID 1 MG/1
1 TABLET ORAL DAILY
Qty: 30 TABLET | Refills: 5 | Status: SHIPPED | OUTPATIENT
Start: 2021-05-04 | End: 2021-10-04 | Stop reason: SDUPTHER

## 2021-05-04 NOTE — PROGRESS NOTES
Notified patient of low normal B12 and folate levels. Will replace with B12 2500 mcg SL daily and Folic Acid 1 mg daily. RX sent to [facility] pharmacy. Will recheck levels with next follow up.    Katelyn Yee, JOHNY  05/04/2021  [id number]

## 2021-05-06 LAB
COPPER SERPL-MCNC: 139 UG/DL (ref 80–158)
ZINC SERPL-MCNC: 84 UG/DL (ref 44–115)

## 2021-06-04 ENCOUNTER — INFUSION (OUTPATIENT)
Dept: ONCOLOGY | Facility: HOSPITAL | Age: 46
End: 2021-06-04

## 2021-06-04 VITALS
WEIGHT: 173.6 LBS | OXYGEN SATURATION: 98 % | BODY MASS INDEX: 29.8 KG/M2 | SYSTOLIC BLOOD PRESSURE: 124 MMHG | RESPIRATION RATE: 18 BRPM | DIASTOLIC BLOOD PRESSURE: 74 MMHG | HEART RATE: 74 BPM | TEMPERATURE: 97.7 F

## 2021-06-04 DIAGNOSIS — D50.9 IRON DEFICIENCY ANEMIA, UNSPECIFIED IRON DEFICIENCY ANEMIA TYPE: ICD-10-CM

## 2021-06-04 DIAGNOSIS — K90.9 INTESTINAL MALABSORPTION, UNSPECIFIED TYPE: Primary | ICD-10-CM

## 2021-06-04 PROCEDURE — 96374 THER/PROPH/DIAG INJ IV PUSH: CPT

## 2021-06-04 PROCEDURE — 96365 THER/PROPH/DIAG IV INF INIT: CPT

## 2021-06-04 PROCEDURE — 25010000002 FERUMOXYTOL 510 MG/17ML SOLUTION 510 MG VIAL: Performed by: NURSE PRACTITIONER

## 2021-06-04 RX ORDER — SODIUM CHLORIDE 9 MG/ML
250 INJECTION, SOLUTION INTRAVENOUS ONCE
Status: COMPLETED | OUTPATIENT
Start: 2021-06-04 | End: 2021-06-04

## 2021-06-04 RX ADMIN — SODIUM CHLORIDE 250 ML: 9 INJECTION, SOLUTION INTRAVENOUS at 14:03

## 2021-06-04 RX ADMIN — FERUMOXYTOL 510 MG: 510 INJECTION INTRAVENOUS at 14:03

## 2021-06-10 ENCOUNTER — INFUSION (OUTPATIENT)
Dept: ONCOLOGY | Facility: HOSPITAL | Age: 46
End: 2021-06-10

## 2021-06-10 VITALS
DIASTOLIC BLOOD PRESSURE: 74 MMHG | RESPIRATION RATE: 18 BRPM | BODY MASS INDEX: 29.61 KG/M2 | WEIGHT: 172.5 LBS | SYSTOLIC BLOOD PRESSURE: 121 MMHG | HEART RATE: 69 BPM | TEMPERATURE: 97.3 F | OXYGEN SATURATION: 97 %

## 2021-06-10 DIAGNOSIS — K90.9 INTESTINAL MALABSORPTION, UNSPECIFIED TYPE: Primary | ICD-10-CM

## 2021-06-10 DIAGNOSIS — D50.9 IRON DEFICIENCY ANEMIA, UNSPECIFIED IRON DEFICIENCY ANEMIA TYPE: ICD-10-CM

## 2021-06-10 PROCEDURE — 96374 THER/PROPH/DIAG INJ IV PUSH: CPT

## 2021-06-10 PROCEDURE — 25010000002 FERUMOXYTOL 510 MG/17ML SOLUTION 510 MG VIAL: Performed by: NURSE PRACTITIONER

## 2021-06-10 RX ORDER — SODIUM CHLORIDE 9 MG/ML
250 INJECTION, SOLUTION INTRAVENOUS ONCE
Status: COMPLETED | OUTPATIENT
Start: 2021-06-10 | End: 2021-06-10

## 2021-06-10 RX ADMIN — SODIUM CHLORIDE 250 ML: 9 INJECTION, SOLUTION INTRAVENOUS at 09:39

## 2021-06-10 RX ADMIN — FERUMOXYTOL 510 MG: 510 INJECTION INTRAVENOUS at 09:39

## 2021-07-27 ENCOUNTER — LAB (OUTPATIENT)
Dept: ONCOLOGY | Facility: CLINIC | Age: 46
End: 2021-07-27

## 2021-07-27 ENCOUNTER — OFFICE VISIT (OUTPATIENT)
Dept: ONCOLOGY | Facility: CLINIC | Age: 46
End: 2021-07-27

## 2021-07-27 VITALS
HEART RATE: 71 BPM | RESPIRATION RATE: 18 BRPM | DIASTOLIC BLOOD PRESSURE: 74 MMHG | OXYGEN SATURATION: 98 % | SYSTOLIC BLOOD PRESSURE: 116 MMHG | TEMPERATURE: 98 F | BODY MASS INDEX: 30.21 KG/M2 | WEIGHT: 176 LBS

## 2021-07-27 DIAGNOSIS — D50.9 IRON DEFICIENCY ANEMIA, UNSPECIFIED IRON DEFICIENCY ANEMIA TYPE: ICD-10-CM

## 2021-07-27 DIAGNOSIS — K90.9 INTESTINAL MALABSORPTION, UNSPECIFIED TYPE: Primary | ICD-10-CM

## 2021-07-27 LAB
BASOPHILS # BLD AUTO: 0.03 10*3/MM3 (ref 0–0.2)
BASOPHILS NFR BLD AUTO: 0.4 % (ref 0–1.5)
DEPRECATED RDW RBC AUTO: 55.4 FL (ref 37–54)
EOSINOPHIL # BLD AUTO: 0.21 10*3/MM3 (ref 0–0.4)
EOSINOPHIL NFR BLD AUTO: 3.1 % (ref 0.3–6.2)
ERYTHROCYTE [DISTWIDTH] IN BLOOD BY AUTOMATED COUNT: 19 % (ref 12.3–15.4)
FERRITIN SERPL-MCNC: 169.1 NG/ML (ref 13–150)
HCT VFR BLD AUTO: 36.7 % (ref 34–46.6)
HGB BLD-MCNC: 11.3 G/DL (ref 12–15.9)
IMM GRANULOCYTES # BLD AUTO: 0.02 10*3/MM3 (ref 0–0.05)
IMM GRANULOCYTES NFR BLD AUTO: 0.3 % (ref 0–0.5)
IRON 24H UR-MRATE: 55 MCG/DL (ref 37–145)
IRON SATN MFR SERPL: 13 % (ref 20–50)
LYMPHOCYTES # BLD AUTO: 2.46 10*3/MM3 (ref 0.7–3.1)
LYMPHOCYTES NFR BLD AUTO: 35.9 % (ref 19.6–45.3)
MCH RBC QN AUTO: 24.9 PG (ref 26.6–33)
MCHC RBC AUTO-ENTMCNC: 30.8 G/DL (ref 31.5–35.7)
MCV RBC AUTO: 80.8 FL (ref 79–97)
MONOCYTES # BLD AUTO: 0.43 10*3/MM3 (ref 0.1–0.9)
MONOCYTES NFR BLD AUTO: 6.3 % (ref 5–12)
NEUTROPHILS NFR BLD AUTO: 3.7 10*3/MM3 (ref 1.7–7)
NEUTROPHILS NFR BLD AUTO: 54 % (ref 42.7–76)
NRBC BLD AUTO-RTO: 0 /100 WBC (ref 0–0.2)
PLATELET # BLD AUTO: 335 10*3/MM3 (ref 140–450)
PMV BLD AUTO: 10 FL (ref 6–12)
RBC # BLD AUTO: 4.54 10*6/MM3 (ref 3.77–5.28)
TIBC SERPL-MCNC: 414 MCG/DL (ref 298–536)
TRANSFERRIN SERPL-MCNC: 278 MG/DL (ref 200–360)
WBC # BLD AUTO: 6.85 10*3/MM3 (ref 3.4–10.8)

## 2021-07-27 PROCEDURE — 82746 ASSAY OF FOLIC ACID SERUM: CPT | Performed by: NURSE PRACTITIONER

## 2021-07-27 PROCEDURE — 84466 ASSAY OF TRANSFERRIN: CPT | Performed by: NURSE PRACTITIONER

## 2021-07-27 PROCEDURE — 85025 COMPLETE CBC W/AUTO DIFF WBC: CPT | Performed by: NURSE PRACTITIONER

## 2021-07-27 PROCEDURE — 99213 OFFICE O/P EST LOW 20 MIN: CPT | Performed by: NURSE PRACTITIONER

## 2021-07-27 PROCEDURE — 82607 VITAMIN B-12: CPT | Performed by: NURSE PRACTITIONER

## 2021-07-27 PROCEDURE — 82728 ASSAY OF FERRITIN: CPT | Performed by: NURSE PRACTITIONER

## 2021-07-27 PROCEDURE — 83540 ASSAY OF IRON: CPT | Performed by: NURSE PRACTITIONER

## 2021-07-27 NOTE — PROGRESS NOTES
DATE:  7/27/2021    DIAGNOSIS: AURY    CHIEF COMPLAINT:  Fatigue-improved    TREATMENT HISTORY:  Feraheme Day 1 06/04/2021  Day 8 06/10/2021    HISTORY OF PRESENT ILLNESS:   Faby Avelar is a very pleasant 45 y.o. female who is being seen today at the request of Dorina Ball MD for evaluation and treatment of iron deficiency anemia. Ms. Avelar reports following with her PCP every 6 months with routine lab testing. Previous available CBCs were reviewed and noted microyctic anemia, Hg 11.5, in December 2020 and Hg 10.3 in April 2021. Her WBC and platelets were normal. There are no other CBCs available to review for comparison of trend. She reports that she has always struggled with AURY and has been on and off oral iron multiple times in the past. She reports today that she has been on oral iron for the last two years and has not been fully compliant due to abdominal pain, nausea and constipation caused by the supplement. She reports fatigue, shortness of breath on exertion and occasional dizziness. She reports that her periods are normal. However, she occasionally struggles with menorrhagia. She follows with GYN and has recommended annual exams. She otherwise, denies blood loss from any source, no melena, rectal bleeding or hematuria. She reports having EGD/colonoscopy in 2006 with Dr. Mak which was unremarkable. She denies history of blood transfusions. She denies any other specific complaints today.    Interval History:  Ms. Avelar presents today for follow up of AURY. She was replaced with IV Feraheme in June. She also started SL B12 and folic acid daily. Overall, she reports that she is feeling better since that time. She reports improved fatigue and increased energy. She denies shortness of breath on exertion, chest pain or dizziness. She reports normal menstrual periods. Otherwise, she denies blood loss from any other source. She denies any new complaints today.     The following portions of the  patient's history were reviewed and updated as appropriate: allergies, current medications, past family history, past medical history, past social history, past surgical history and problem list.    PAST MEDICAL HISTORY:  Past Medical History:   Diagnosis Date   • Diabetes mellitus (CMS/HCC)    • Elevated cholesterol    • Heartburn    • Hypertension    • Low iron    • Thyroid activity decreased        PAST SURGICAL HISTORY:  Past Surgical History:   Procedure Laterality Date   • BREAST BIOPSY Right 10/29/2020    Procedure: BREAST BIOPSY WITH NEEDLE LOCALIZATION;  Surgeon: Dao Cano MD;  Location: [facility];  Service: General;  Laterality: Right;   • GALLBLADDER SURGERY     • WRIST SURGERY         SOCIAL HISTORY:  Social History     Socioeconomic History   • Marital status:      Spouse name: Not on file   • Number of children: Not on file   • Years of education: Not on file   • Highest education level: Not on file   Tobacco Use   • Smoking status: Never Smoker   • Smokeless tobacco: Never Used   Vaping Use   • Vaping Use: Never used   Substance and Sexual Activity   • Alcohol use: Never   • Drug use: Never   • Sexual activity: Defer           FAMILY HISTORY:  Family History   Problem Relation Age of Onset   • COPD Mother    • Hypertension Mother    • Skin cancer Mother    • Heart disease Father    • Diabetes Father    • Hyperlipidemia Father          MEDICATIONS:  The current medication list was reviewed in the EMR    Current Outpatient Medications:   •  fenofibrate (TRICOR) 145 MG tablet, Take 145 mg by mouth Daily., Disp: , Rfl:   •  folic acid (FOLVITE) 1 MG tablet, Take 1 tablet by mouth Daily., Disp: 30 tablet, Rfl: 5  •  gabapentin (NEURONTIN) 300 MG capsule, TAKE 1 (ONE) CAPSULE BY MOUTH AT BEDTIME, Disp: , Rfl:   •  irbesartan (AVAPRO) 150 MG tablet, Take 150 mg by mouth Daily., Disp: , Rfl:   •  levothyroxine (SYNTHROID, LEVOTHROID) 75 MCG tablet, Take 125 mcg by mouth Daily., Disp: , Rfl:    •  vitamin B-12 (VITAMIN B-12) 2500 MCG tablet tablet, Take 1 tablet by mouth Daily., Disp: 30 tablet, Rfl: 5  •  vitamin D (ERGOCALCIFEROL) 1.25 MG (07060 UT) capsule capsule, Take 50,000 Units by mouth Daily., Disp: , Rfl:   •  acetaminophen (TYLENOL) 325 MG tablet, Take 2 tablets by mouth Every 4 (Four) Hours As Needed for Mild Pain ., Disp: 30 tablet, Rfl: 0  •  Ferrex 150 150 MG capsule, Take 150 mg by mouth Daily As Needed., Disp: , Rfl:   •  ibuprofen (ADVIL,MOTRIN) 600 MG tablet, Take 1 tablet by mouth Every 6 (Six) Hours As Needed for Mild Pain ., Disp: 30 tablet, Rfl: 0  •  Jardiance 25 MG tablet, Take 1 tablet by mouth Daily., Disp: , Rfl:   •  Ozempic, 1 MG/DOSE, 2 MG/1.5ML solution pen-injector, Inject 1 mg under the skin into the appropriate area as directed 1 (One) Time Per Week., Disp: , Rfl:   •  traMADol (ULTRAM) 50 MG tablet, Take 1 tablet by mouth Every 8 (Eight) Hours As Needed for Moderate Pain ., Disp: 6 tablet, Rfl: 0    ALLERGIES:    Allergies   Allergen Reactions   • Mushroom Other (See Comments)     PT REPORTS THROAT TINGLES AND MOUTH GET NUMB         REVIEW OF SYSTEMS:    A comprehensive 14 point review of systems was performed.  Significant findings as mentioned above.  All other systems reviewed and are negative.        Physical Exam   Vital Signs:   Vitals:    07/27/21 1358   BP: 116/74   Pulse: 71   Resp: 18   Temp: 98 °F (36.7 °C)   SpO2: 98%    Patient's Body mass index is 30.21 kg/m².     ECOG score: 0   General: Well developed, well nourished, alert and oriented x 3, in no acute distress.   Head: ATNC   Eyes: PERRL, No evidence of conjunctivitis.   Nose: No nasal discharge.   Mouth: Oral mucosal membranes moist. No oral ulceration or hemorrhages.   Neck: Neck supple. No thyromegaly. No JVD.   Lungs: Clear in all fields to A&P without rales, rhonchi or wheezing.   Heart: S1, S2. Regular rate and rhythm. No murmurs, rubs, or gallops.   Abdomen: Soft. Bowel sounds are normoactive.  Nontender with palpation. No Hepatosplenomegaly can be appreciated.   Extremities: No clubbing, cyanosis or edema.   Integumentary: Warm, dry, intact.   Neurologic: Grossly non-focal exam.    Pain Score:  Pain Score    07/27/21 1358   PainSc: 0-No pain       PHQ-Score Total:  PHQ-9 Total Score:         IMAGING:        RECENT LABS:  Lab Results   Component Value Date    WBC 6.85 07/27/2021    HGB 11.3 (L) 07/27/2021    HCT 36.7 07/27/2021    MCV 80.8 07/27/2021    RDW 19.0 (H) 07/27/2021     07/27/2021    NEUTRORELPCT 54.0 07/27/2021    LYMPHORELPCT 35.9 07/27/2021    MONORELPCT 6.3 07/27/2021    EOSRELPCT 3.1 07/27/2021    BASORELPCT 0.4 07/27/2021    NEUTROABS 3.70 07/27/2021    LYMPHSABS 2.46 07/27/2021       Lab Results   Component Value Date     05/03/2021    K 4.3 05/03/2021    CO2 23.6 05/03/2021     05/03/2021    BUN 11 05/03/2021    CREATININE 0.88 05/03/2021    EGFRIFNONA 70 05/03/2021    EGFRIFAFRI 125 01/29/2015    GLUCOSE 81 05/03/2021    CALCIUM 9.7 05/03/2021    ALKPHOS 34 (L) 05/03/2021    AST 24 05/03/2021    ALT 23 05/03/2021    BILITOT 0.3 05/03/2021    ALBUMIN 4.63 05/03/2021    PROTEINTOT 8.1 05/03/2021       Lab Results   Component Value Date     05/03/2021       Lab Results   Component Value Date    FERRITIN 11.70 (L) 05/03/2021    IRON 44 05/03/2021    TIBC 660 (H) 05/03/2021    LABIRON 7 (L) 05/03/2021    YGQPFKJW35 211 05/03/2021    FOLATE 5.01 05/03/2021    HAPTOGLOBIN 120 05/03/2021    RETICCTPCT 0.90 05/03/2021    RETIC 0.0441 05/03/2021        Work up 05/03/2021      Sed Rate   0 - 20 mm/hr 8      C-Reactive Protein   0.00 - 0.50 mg/dL 0.35      Copper   80 - 158 ug/dL 139      Zinc   44 - 115 ug/dL 84      Tissue Transglutaminase IgA   0 - 3 U/mL <2          ASSESSMENT & PLAN:  Faby Avelar is a very pleasant 45 y.o. female with    1.Microcytic Anemia  2. AURY  - Previous available CBCs were reviewed and noted microyctic anemia, Hg 11.5, in December 2020 and Hg  "10.3 in April 2021. Her WBC and platelets were normal. There are no other CBCs available to review for comparison of trend. She reports that she has always struggled with AURY and has been on and off oral iron multiple times in the past. She reports today that she has been on oral iron for the last two years and has not been fully compliant due to abdominal pain, nausea and constipation caused by the supplement.   - She reports that her periods are normal. However, she occasionally struggles with menorrhagia. She follows with GYN and has recommended annual exams.  - Denies blood loss from any source, no melena, rectal bleeding or hematuria. She reports having EGD/colonoscopy in 2006 with Dr. Mak which was unremarkable.   - She denies history of blood transfusions.  - CBC from initial consultation showed Hg 10.7. Iron panel and ferritin were low and most consistent with iron deficiency anemia and anemia of chronic disease/inflammation. Therefore, discontinued oral iron and replaced with IV Feraheme in June.   - Also sent additional labs to further evaluate including PBS (summarized above) which showed microcytic, hypochromic anemia with anisocytosis including \"pencil\" cells, compatible with the provided history of AURY, normal WBC and differential, mature WBC morphology with no granulocytic dysplasia or blasts identified, adequate platelets. B12 and folate were low and she was started on B12 SL 2500 mcg daily and folic acid 1 mg daily. No evidence of hemolysis as Retic, LDH and Haptoglobin were normal. ESR and CRP normal.  Copper, zinc and tissue transglutaminase were normal.  - Repeat CBC from today is showing improved Hg ~11.3. Iron and ferritin are replete. Repeat B12 and folate levels are pending from today.   - Will repeat labs in 2 months and replace with IV Feraheme as needed. Will follow up in 4 months with repeat labs.   - Again, recommended repeat GI evaluation. Patient declines at this time.      ACO / " TY/Other  Quality measures  -  Faby Avelar received 2020 flu vaccine.  -  Faby Avelar reports a pain score of 0.    -  Current outpatient and discharge medications have been reconciled for the patient.  Reviewed by: JOHNY Ivan      A total of 25 minutes were spent coordinating this patient’s care in clinic today; more than 50% of this time was face-to-face with the patient, reviewing her interim medical history and counseling on the current treatment and followup plan. All questions were answered to her satisfaction.          Electronically Signed by: JOHNY Restrepo APRN July 27, 2021 14:08 EDT       CC:   No ref. provider found  Dorina Ball MD

## 2021-07-28 LAB
FOLATE SERPL-MCNC: >20 NG/ML (ref 4.78–24.2)
VIT B12 BLD-MCNC: 1839 PG/ML (ref 211–946)

## 2021-09-27 ENCOUNTER — LAB (OUTPATIENT)
Dept: ONCOLOGY | Facility: CLINIC | Age: 46
End: 2021-09-27

## 2021-09-27 VITALS
RESPIRATION RATE: 20 BRPM | SYSTOLIC BLOOD PRESSURE: 136 MMHG | HEART RATE: 64 BPM | OXYGEN SATURATION: 100 % | TEMPERATURE: 97.3 F | DIASTOLIC BLOOD PRESSURE: 86 MMHG

## 2021-09-27 DIAGNOSIS — D50.9 IRON DEFICIENCY ANEMIA, UNSPECIFIED IRON DEFICIENCY ANEMIA TYPE: ICD-10-CM

## 2021-09-27 DIAGNOSIS — K90.9 INTESTINAL MALABSORPTION, UNSPECIFIED TYPE: ICD-10-CM

## 2021-09-27 LAB
BASOPHILS # BLD AUTO: 0.02 10*3/MM3 (ref 0–0.2)
BASOPHILS NFR BLD AUTO: 0.3 % (ref 0–1.5)
DEPRECATED RDW RBC AUTO: 38.5 FL (ref 37–54)
EOSINOPHIL # BLD AUTO: 0.22 10*3/MM3 (ref 0–0.4)
EOSINOPHIL NFR BLD AUTO: 3.2 % (ref 0.3–6.2)
ERYTHROCYTE [DISTWIDTH] IN BLOOD BY AUTOMATED COUNT: 13.3 % (ref 12.3–15.4)
FERRITIN SERPL-MCNC: 66.98 NG/ML (ref 13–150)
HCT VFR BLD AUTO: 39.9 % (ref 34–46.6)
HGB BLD-MCNC: 12.4 G/DL (ref 12–15.9)
IMM GRANULOCYTES # BLD AUTO: 0.02 10*3/MM3 (ref 0–0.05)
IMM GRANULOCYTES NFR BLD AUTO: 0.3 % (ref 0–0.5)
IRON 24H UR-MRATE: 47 MCG/DL (ref 37–145)
IRON SATN MFR SERPL: 11 % (ref 20–50)
LYMPHOCYTES # BLD AUTO: 1.99 10*3/MM3 (ref 0.7–3.1)
LYMPHOCYTES NFR BLD AUTO: 29.2 % (ref 19.6–45.3)
MCH RBC QN AUTO: 25 PG (ref 26.6–33)
MCHC RBC AUTO-ENTMCNC: 31.1 G/DL (ref 31.5–35.7)
MCV RBC AUTO: 80.4 FL (ref 79–97)
MONOCYTES # BLD AUTO: 0.42 10*3/MM3 (ref 0.1–0.9)
MONOCYTES NFR BLD AUTO: 6.2 % (ref 5–12)
NEUTROPHILS NFR BLD AUTO: 4.15 10*3/MM3 (ref 1.7–7)
NEUTROPHILS NFR BLD AUTO: 60.8 % (ref 42.7–76)
NRBC BLD AUTO-RTO: 0 /100 WBC (ref 0–0.2)
PLATELET # BLD AUTO: 304 10*3/MM3 (ref 140–450)
PMV BLD AUTO: 9.9 FL (ref 6–12)
RBC # BLD AUTO: 4.96 10*6/MM3 (ref 3.77–5.28)
TIBC SERPL-MCNC: 410 MCG/DL (ref 298–536)
TRANSFERRIN SERPL-MCNC: 275 MG/DL (ref 200–360)
WBC # BLD AUTO: 6.82 10*3/MM3 (ref 3.4–10.8)

## 2021-09-27 PROCEDURE — 82607 VITAMIN B-12: CPT | Performed by: NURSE PRACTITIONER

## 2021-09-27 PROCEDURE — 84466 ASSAY OF TRANSFERRIN: CPT | Performed by: NURSE PRACTITIONER

## 2021-09-27 PROCEDURE — 84155 ASSAY OF PROTEIN SERUM: CPT | Performed by: NURSE PRACTITIONER

## 2021-09-27 PROCEDURE — 85025 COMPLETE CBC W/AUTO DIFF WBC: CPT | Performed by: NURSE PRACTITIONER

## 2021-09-27 PROCEDURE — 84165 PROTEIN E-PHORESIS SERUM: CPT | Performed by: NURSE PRACTITIONER

## 2021-09-27 PROCEDURE — 82784 ASSAY IGA/IGD/IGG/IGM EACH: CPT | Performed by: NURSE PRACTITIONER

## 2021-09-27 PROCEDURE — 83883 ASSAY NEPHELOMETRY NOT SPEC: CPT | Performed by: NURSE PRACTITIONER

## 2021-09-27 PROCEDURE — 83540 ASSAY OF IRON: CPT | Performed by: NURSE PRACTITIONER

## 2021-09-27 PROCEDURE — 86334 IMMUNOFIX E-PHORESIS SERUM: CPT | Performed by: NURSE PRACTITIONER

## 2021-09-27 PROCEDURE — 82728 ASSAY OF FERRITIN: CPT | Performed by: NURSE PRACTITIONER

## 2021-09-27 PROCEDURE — 82746 ASSAY OF FOLIC ACID SERUM: CPT | Performed by: NURSE PRACTITIONER

## 2021-09-28 LAB
ALBUMIN SERPL ELPH-MCNC: 4 G/DL (ref 2.9–4.4)
ALBUMIN/GLOB SERPL: 1.4 {RATIO} (ref 0.7–1.7)
ALPHA1 GLOB SERPL ELPH-MCNC: 0.2 G/DL (ref 0–0.4)
ALPHA2 GLOB SERPL ELPH-MCNC: 0.7 G/DL (ref 0.4–1)
B-GLOBULIN SERPL ELPH-MCNC: 1 G/DL (ref 0.7–1.3)
FOLATE SERPL-MCNC: >20 NG/ML (ref 4.78–24.2)
GAMMA GLOB SERPL ELPH-MCNC: 1 G/DL (ref 0.4–1.8)
GLOBULIN SER-MCNC: 2.9 G/DL (ref 2.2–3.9)
IGA SERPL-MCNC: 319 MG/DL (ref 87–352)
IGG SERPL-MCNC: 958 MG/DL (ref 586–1602)
IGM SERPL-MCNC: 115 MG/DL (ref 26–217)
INTERPRETATION SERPL IEP-IMP: NORMAL
KAPPA LC FREE SER-MCNC: 17.7 MG/L (ref 3.3–19.4)
KAPPA LC FREE/LAMBDA FREE SER: 1.04 {RATIO} (ref 0.26–1.65)
LABORATORY COMMENT REPORT: NORMAL
LAMBDA LC FREE SERPL-MCNC: 17.1 MG/L (ref 5.7–26.3)
M PROTEIN SERPL ELPH-MCNC: NORMAL G/DL
PROT SERPL-MCNC: 6.9 G/DL (ref 6–8.5)
VIT B12 BLD-MCNC: >2000 PG/ML (ref 211–946)

## 2021-10-04 RX ORDER — FOLIC ACID 1 MG/1
1 TABLET ORAL DAILY
Qty: 30 TABLET | Refills: 5 | Status: SHIPPED | OUTPATIENT
Start: 2021-10-04 | End: 2022-06-07 | Stop reason: SDUPTHER

## 2021-10-04 NOTE — TELEPHONE ENCOUNTER
From: Faby Avelar  To: Office of JOHNY Restrepo  Sent: 10/3/2021 9:17 AM EDT  Subject: Medication Renewal Request    Refills have been requested for the following medications:     folic acid (FOLVITE) 1 MG tablet [JOHNY Restrepo]     vitamin B-12 (VITAMIN B-12) 2500 MCG tablet tablet [JOHNY Restrepo]    Preferred pharmacy: [id number] [address] [phone] [facility] [phone] FX  Delivery method: Pickup

## 2021-11-19 ENCOUNTER — OFFICE VISIT (OUTPATIENT)
Dept: ONCOLOGY | Facility: CLINIC | Age: 46
End: 2021-11-19

## 2021-11-19 ENCOUNTER — LAB (OUTPATIENT)
Dept: ONCOLOGY | Facility: CLINIC | Age: 46
End: 2021-11-19

## 2021-11-19 VITALS
WEIGHT: 186.2 LBS | OXYGEN SATURATION: 98 % | HEART RATE: 75 BPM | TEMPERATURE: 97.1 F | RESPIRATION RATE: 18 BRPM | SYSTOLIC BLOOD PRESSURE: 124 MMHG | BODY MASS INDEX: 31.79 KG/M2 | HEIGHT: 64 IN | DIASTOLIC BLOOD PRESSURE: 80 MMHG

## 2021-11-19 DIAGNOSIS — D50.9 IRON DEFICIENCY ANEMIA, UNSPECIFIED IRON DEFICIENCY ANEMIA TYPE: ICD-10-CM

## 2021-11-19 DIAGNOSIS — K90.9 INTESTINAL MALABSORPTION, UNSPECIFIED TYPE: ICD-10-CM

## 2021-11-19 DIAGNOSIS — K90.9 INTESTINAL MALABSORPTION, UNSPECIFIED TYPE: Primary | ICD-10-CM

## 2021-11-19 DIAGNOSIS — E53.8 FOLATE DEFICIENCY: ICD-10-CM

## 2021-11-19 DIAGNOSIS — E53.8 B12 DEFICIENCY: ICD-10-CM

## 2021-11-19 LAB
BASOPHILS # BLD AUTO: 0.03 10*3/MM3 (ref 0–0.2)
BASOPHILS NFR BLD AUTO: 0.5 % (ref 0–1.5)
DEPRECATED RDW RBC AUTO: 39.4 FL (ref 37–54)
EOSINOPHIL # BLD AUTO: 0.14 10*3/MM3 (ref 0–0.4)
EOSINOPHIL NFR BLD AUTO: 2.3 % (ref 0.3–6.2)
ERYTHROCYTE [DISTWIDTH] IN BLOOD BY AUTOMATED COUNT: 13.3 % (ref 12.3–15.4)
FERRITIN SERPL-MCNC: 64.73 NG/ML (ref 13–150)
HCT VFR BLD AUTO: 39.4 % (ref 34–46.6)
HGB BLD-MCNC: 11.7 G/DL (ref 12–15.9)
IMM GRANULOCYTES # BLD AUTO: 0.02 10*3/MM3 (ref 0–0.05)
IMM GRANULOCYTES NFR BLD AUTO: 0.3 % (ref 0–0.5)
IRON 24H UR-MRATE: 63 MCG/DL (ref 37–145)
IRON SATN MFR SERPL: 16 % (ref 20–50)
LYMPHOCYTES # BLD AUTO: 1.7 10*3/MM3 (ref 0.7–3.1)
LYMPHOCYTES NFR BLD AUTO: 27.9 % (ref 19.6–45.3)
MCH RBC QN AUTO: 24.3 PG (ref 26.6–33)
MCHC RBC AUTO-ENTMCNC: 29.7 G/DL (ref 31.5–35.7)
MCV RBC AUTO: 81.7 FL (ref 79–97)
MONOCYTES # BLD AUTO: 0.39 10*3/MM3 (ref 0.1–0.9)
MONOCYTES NFR BLD AUTO: 6.4 % (ref 5–12)
NEUTROPHILS NFR BLD AUTO: 3.81 10*3/MM3 (ref 1.7–7)
NEUTROPHILS NFR BLD AUTO: 62.6 % (ref 42.7–76)
NRBC BLD AUTO-RTO: 0 /100 WBC (ref 0–0.2)
PLATELET # BLD AUTO: 309 10*3/MM3 (ref 140–450)
PMV BLD AUTO: 10.1 FL (ref 6–12)
RBC # BLD AUTO: 4.82 10*6/MM3 (ref 3.77–5.28)
TIBC SERPL-MCNC: 402 MCG/DL (ref 298–536)
TRANSFERRIN SERPL-MCNC: 270 MG/DL (ref 200–360)
WBC NRBC COR # BLD: 6.09 10*3/MM3 (ref 3.4–10.8)

## 2021-11-19 PROCEDURE — 99214 OFFICE O/P EST MOD 30 MIN: CPT | Performed by: NURSE PRACTITIONER

## 2021-11-19 PROCEDURE — 83540 ASSAY OF IRON: CPT | Performed by: NURSE PRACTITIONER

## 2021-11-19 PROCEDURE — 84466 ASSAY OF TRANSFERRIN: CPT | Performed by: NURSE PRACTITIONER

## 2021-11-19 PROCEDURE — 82728 ASSAY OF FERRITIN: CPT | Performed by: NURSE PRACTITIONER

## 2021-11-19 PROCEDURE — 85025 COMPLETE CBC W/AUTO DIFF WBC: CPT | Performed by: NURSE PRACTITIONER

## 2021-11-19 NOTE — PROGRESS NOTES
DATE:  11/19/2021    DIAGNOSIS: AURY    CHIEF COMPLAINT:  None.    TREATMENT HISTORY:  Feraheme Day 1 06/04/2021  Day 8 06/10/2021  B12 2500 mcg SL daily  Folic Acid 1 mg daily    HISTORY OF PRESENT ILLNESS:   Faby Avelar is a very pleasant 46 y.o. female who is being seen today at the request of Dorina Ball MD for evaluation and treatment of iron deficiency anemia. Ms. Avelar reports following with her PCP every 6 months with routine lab testing. Previous available CBCs were reviewed and noted microyctic anemia, Hg 11.5, in December 2020 and Hg 10.3 in April 2021. Her WBC and platelets were normal. There are no other CBCs available to review for comparison of trend. She reports that she has always struggled with AURY and has been on and off oral iron multiple times in the past. She reports today that she has been on oral iron for the last two years and has not been fully compliant due to abdominal pain, nausea and constipation caused by the supplement. She reports fatigue, shortness of breath on exertion and occasional dizziness. She reports that her periods are normal. However, she occasionally struggles with menorrhagia. She follows with GYN and has recommended annual exams. She otherwise, denies blood loss from any source, no melena, rectal bleeding or hematuria. She reports having EGD/colonoscopy in 2006 with Dr. Mak which was unremarkable. She denies history of blood transfusions. She denies any other specific complaints today.    Interval History:  Ms. Avelar presents today for follow up of AURY. She was replaced with IV Feraheme in June. She also started SL B12 and folic acid daily. Overall, she reports that she has been doing well.  She continues to report improved fatigue and increased energy. She denies shortness of breath on exertion, chest pain or dizziness. She reports normal menstrual periods. Otherwise, she denies blood loss from any other source. She denies any new complaints today.      The following portions of the patient's history were reviewed and updated as appropriate: allergies, current medications, past family history, past medical history, past social history, past surgical history and problem list.    PAST MEDICAL HISTORY:  Past Medical History:   Diagnosis Date   • Diabetes mellitus (HCC)    • Elevated cholesterol    • Heartburn    • Hypertension    • Low iron    • Thyroid activity decreased        PAST SURGICAL HISTORY:  Past Surgical History:   Procedure Laterality Date   • BREAST BIOPSY Right 10/29/2020    Procedure: BREAST BIOPSY WITH NEEDLE LOCALIZATION;  Surgeon: Dao Cano MD;  Location: [facility];  Service: General;  Laterality: Right;   • GALLBLADDER SURGERY     • WRIST SURGERY         SOCIAL HISTORY:  Social History     Socioeconomic History   • Marital status:    Tobacco Use   • Smoking status: Never Smoker   • Smokeless tobacco: Never Used   Vaping Use   • Vaping Use: Never used   Substance and Sexual Activity   • Alcohol use: Never   • Drug use: Never   • Sexual activity: Defer           FAMILY HISTORY:  Family History   Problem Relation Age of Onset   • COPD Mother    • Hypertension Mother    • Skin cancer Mother    • Heart disease Father    • Diabetes Father    • Hyperlipidemia Father          MEDICATIONS:  The current medication list was reviewed in the EMR    Current Outpatient Medications:   •  cyanocobalamin (VITAMIN B-12) 2500 MCG tablet tablet, Take 1 tablet by mouth Daily., Disp: 30 tablet, Rfl: 5  •  fenofibrate (TRICOR) 145 MG tablet, Take 145 mg by mouth Daily., Disp: , Rfl:   •  folic acid (FOLVITE) 1 MG tablet, Take 1 tablet by mouth Daily., Disp: 30 tablet, Rfl: 5  •  gabapentin (NEURONTIN) 300 MG capsule, TAKE 1 (ONE) CAPSULE BY MOUTH AT BEDTIME, Disp: , Rfl:   •  irbesartan (AVAPRO) 150 MG tablet, Take 150 mg by mouth Daily., Disp: , Rfl:   •  levothyroxine (SYNTHROID, LEVOTHROID) 75 MCG tablet, Take 125 mcg by mouth Daily., Disp: ,  Rfl:   •  Ozempic, 1 MG/DOSE, 2 MG/1.5ML solution pen-injector, Inject 1 mg under the skin into the appropriate area as directed 1 (One) Time Per Week., Disp: , Rfl:   •  vitamin D (ERGOCALCIFEROL) 1.25 MG (02251 UT) capsule capsule, Take 50,000 Units by mouth Daily., Disp: , Rfl:   •  acetaminophen (TYLENOL) 325 MG tablet, Take 2 tablets by mouth Every 4 (Four) Hours As Needed for Mild Pain ., Disp: 30 tablet, Rfl: 0  •  Ferrex 150 150 MG capsule, Take 150 mg by mouth Daily As Needed., Disp: , Rfl:   •  ibuprofen (ADVIL,MOTRIN) 600 MG tablet, Take 1 tablet by mouth Every 6 (Six) Hours As Needed for Mild Pain ., Disp: 30 tablet, Rfl: 0  •  Jardiance 25 MG tablet, Take 1 tablet by mouth Daily., Disp: , Rfl:   •  traMADol (ULTRAM) 50 MG tablet, Take 1 tablet by mouth Every 8 (Eight) Hours As Needed for Moderate Pain ., Disp: 6 tablet, Rfl: 0    ALLERGIES:    Allergies   Allergen Reactions   • Mushroom Other (See Comments)     PT REPORTS THROAT TINGLES AND MOUTH GET NUMB         REVIEW OF SYSTEMS:    A comprehensive 14 point review of systems was performed.  Significant findings as mentioned above.  All other systems reviewed and are negative.        Physical Exam   Vital Signs:   Vitals:    11/19/21 0827   BP: 124/80   Pulse: 75   Resp: 18   Temp: 97.1 °F (36.2 °C)   SpO2: 98%    Patient's Body mass index is 31.96 kg/m².     ECOG score: 0   General: Well developed, well nourished, alert and oriented x 3, in no acute distress.   Head: ATNC   Eyes: PERRL, No evidence of conjunctivitis.   Nose: No nasal discharge.   Mouth: Oral mucosal membranes moist. No oral ulceration or hemorrhages.   Neck: Neck supple. No thyromegaly. No JVD.   Lungs: Clear in all fields to A&P without rales, rhonchi or wheezing.   Heart: S1, S2. Regular rate and rhythm. No murmurs, rubs, or gallops.   Abdomen: Soft. Bowel sounds are normoactive. Nontender with palpation. No Hepatosplenomegaly can be appreciated.   Extremities: No clubbing, cyanosis  or edema.   Integumentary: Warm, dry, intact.   Neurologic: Grossly non-focal exam.    Pain Score:  Pain Score    11/19/21 0827   PainSc: 0-No pain       PHQ-Score Total:  PHQ-9 Total Score:         IMAGING:        RECENT LABS:  Lab Results   Component Value Date    WBC 6.09 11/19/2021    HGB 11.7 (L) 11/19/2021    HCT 39.4 11/19/2021    MCV 81.7 11/19/2021    RDW 13.3 11/19/2021     11/19/2021    NEUTRORELPCT 62.6 11/19/2021    LYMPHORELPCT 27.9 11/19/2021    MONORELPCT 6.4 11/19/2021    EOSRELPCT 2.3 11/19/2021    BASORELPCT 0.5 11/19/2021    NEUTROABS 3.81 11/19/2021    LYMPHSABS 1.70 11/19/2021       Lab Results   Component Value Date     05/03/2021    K 4.3 05/03/2021    CO2 23.6 05/03/2021     05/03/2021    BUN 11 05/03/2021    CREATININE 0.88 05/03/2021    EGFRIFNONA 70 05/03/2021    EGFRIFAFRI 125 01/29/2015    GLUCOSE 81 05/03/2021    CALCIUM 9.7 05/03/2021    ALKPHOS 34 (L) 05/03/2021    AST 24 05/03/2021    ALT 23 05/03/2021    BILITOT 0.3 05/03/2021    ALBUMIN 4.0 09/27/2021    PROTEINTOT 8.1 05/03/2021       Lab Results   Component Value Date     05/03/2021       Lab Results   Component Value Date    FERRITIN 64.73 11/19/2021    IRON 63 11/19/2021    TIBC 402 11/19/2021    LABIRON 16 (L) 11/19/2021    [id number] >2,000 (H) 09/27/2021    FOLATE >20.00 09/27/2021    HAPTOGLOBIN 120 05/03/2021    RETICCTPCT 0.90 05/03/2021    RETIC 0.0441 05/03/2021        Work up 05/03/2021      Sed Rate   0 - 20 mm/hr 8      C-Reactive Protein   0.00 - 0.50 mg/dL 0.35      Copper   80 - 158 ug/dL 139      Zinc   44 - 115 ug/dL 84      Tissue Transglutaminase IgA   0 - 3 U/mL <2          ASSESSMENT & PLAN:  Faby Avelar is a very pleasant 46 y.o. female with    1.Microcytic Anemia  2. AURY  3. B12 deficiency  4. Folate deficiency  - Previous available CBCs were reviewed and noted microyctic anemia, Hg 11.5, in December 2020 and Hg 10.3 in April 2021. Her WBC and platelets were normal. There are  "no other CBCs available to review for comparison of trend. She reports that she has always struggled with AURY and has been on and off oral iron multiple times in the past. She reports today that she has been on oral iron for the last two years and has not been fully compliant due to abdominal pain, nausea and constipation caused by the supplement.   - She reports that her periods are normal. However, she occasionally struggles with menorrhagia. She follows with GYN and has recommended annual exams.  - Denies blood loss from any source, no melena, rectal bleeding or hematuria. She reports having EGD/colonoscopy in 2006 with Dr. Mak which was unremarkable.   - She denies history of blood transfusions.  - CBC from initial consultation showed Hg 10.7. Iron panel and ferritin were low and most consistent with iron deficiency anemia and anemia of chronic disease/inflammation. Therefore, discontinued oral iron and replaced with IV Feraheme in June.   - Also sent additional labs to further evaluate including PBS (summarized above) which showed microcytic, hypochromic anemia with anisocytosis including \"pencil\" cells, compatible with the provided history of AURY, normal WBC and differential, mature WBC morphology with no granulocytic dysplasia or blasts identified, adequate platelets. B12 and folate were low and she was started on B12 SL 2500 mcg daily and folic acid 1 mg daily. No evidence of hemolysis as Retic, LDH and Haptoglobin were normal. ESR and CRP normal.  Copper, zinc and tissue transglutaminase were normal. To further evaluate anemia obtained serum LINDA which showed no M-spike and serum free light chains revealed normal kappa/lambda ratio.  - Repeat CBC from today is showing improved Hg ~11.7. Iron and ferritin are replete.   - Will repeat labs in 2 months and replace with IV Feraheme as needed. Will follow up in 4 months with repeat labs.   - Again, recommended repeat GI evaluation. Patient declines at this " time.      ACO / TY/Other  Quality measures  -  Faby Avelar received 2021 flu vaccine.  -  Faby Avelar reports a pain score of 0.    -  Current outpatient and discharge medications have been reconciled for the patient.  Reviewed by: JOHNY Ivan      A total of 30 minutes were spent coordinating this patient’s care in clinic today; more than 50% of this time was face-to-face with the patient, reviewing her interim medical history and counseling on the current treatment and followup plan. All questions were answered to her satisfaction.          Electronically Signed by: JOHNY Restrepo , JOHNY November 19, 2021 09:15 EST       CC:   No ref. provider found  Dorina Ball MD

## 2022-03-07 ENCOUNTER — TELEPHONE (OUTPATIENT)
Dept: ONCOLOGY | Facility: CLINIC | Age: 47
End: 2022-03-07

## 2022-03-07 NOTE — TELEPHONE ENCOUNTER
chau     Caller: Faby Avelar    Relationship to patient: Self    Best call back number:[phone]    Chief complaint: PATIENT TO RESCHED FROM 3/9/22    Type of visit:LAB AND FU    Requested date: 3/16/22 IF POSSIBLE OR NA

## 2022-03-21 ENCOUNTER — LAB (OUTPATIENT)
Dept: ONCOLOGY | Facility: CLINIC | Age: 47
End: 2022-03-21

## 2022-03-21 ENCOUNTER — OFFICE VISIT (OUTPATIENT)
Dept: ONCOLOGY | Facility: CLINIC | Age: 47
End: 2022-03-21

## 2022-03-21 VITALS
SYSTOLIC BLOOD PRESSURE: 149 MMHG | RESPIRATION RATE: 20 BRPM | OXYGEN SATURATION: 100 % | WEIGHT: 190.4 LBS | DIASTOLIC BLOOD PRESSURE: 97 MMHG | HEART RATE: 72 BPM | TEMPERATURE: 97.5 F | BODY MASS INDEX: 32.68 KG/M2

## 2022-03-21 DIAGNOSIS — D50.9 IRON DEFICIENCY ANEMIA, UNSPECIFIED IRON DEFICIENCY ANEMIA TYPE: ICD-10-CM

## 2022-03-21 DIAGNOSIS — K90.9 INTESTINAL MALABSORPTION, UNSPECIFIED TYPE: Primary | ICD-10-CM

## 2022-03-21 DIAGNOSIS — K90.9 INTESTINAL MALABSORPTION, UNSPECIFIED TYPE: ICD-10-CM

## 2022-03-21 LAB
BASOPHILS # BLD AUTO: 0.04 10*3/MM3 (ref 0–0.2)
BASOPHILS NFR BLD AUTO: 0.6 % (ref 0–1.5)
DEPRECATED RDW RBC AUTO: 40.1 FL (ref 37–54)
EOSINOPHIL # BLD AUTO: 0.14 10*3/MM3 (ref 0–0.4)
EOSINOPHIL NFR BLD AUTO: 2 % (ref 0.3–6.2)
ERYTHROCYTE [DISTWIDTH] IN BLOOD BY AUTOMATED COUNT: 14.4 % (ref 12.3–15.4)
FERRITIN SERPL-MCNC: 36.56 NG/ML (ref 13–150)
HCT VFR BLD AUTO: 37.6 % (ref 34–46.6)
HGB BLD-MCNC: 11.6 G/DL (ref 12–15.9)
IMM GRANULOCYTES # BLD AUTO: 0.02 10*3/MM3 (ref 0–0.05)
IMM GRANULOCYTES NFR BLD AUTO: 0.3 % (ref 0–0.5)
IRON 24H UR-MRATE: 45 MCG/DL (ref 37–145)
IRON SATN MFR SERPL: 10 % (ref 20–50)
LYMPHOCYTES # BLD AUTO: 2.27 10*3/MM3 (ref 0.7–3.1)
LYMPHOCYTES NFR BLD AUTO: 32.7 % (ref 19.6–45.3)
MCH RBC QN AUTO: 23.9 PG (ref 26.6–33)
MCHC RBC AUTO-ENTMCNC: 30.9 G/DL (ref 31.5–35.7)
MCV RBC AUTO: 77.4 FL (ref 79–97)
MONOCYTES # BLD AUTO: 0.46 10*3/MM3 (ref 0.1–0.9)
MONOCYTES NFR BLD AUTO: 6.6 % (ref 5–12)
NEUTROPHILS NFR BLD AUTO: 4.02 10*3/MM3 (ref 1.7–7)
NEUTROPHILS NFR BLD AUTO: 57.8 % (ref 42.7–76)
NRBC BLD AUTO-RTO: 0 /100 WBC (ref 0–0.2)
PLATELET # BLD AUTO: 336 10*3/MM3 (ref 140–450)
PMV BLD AUTO: 9.3 FL (ref 6–12)
RBC # BLD AUTO: 4.86 10*6/MM3 (ref 3.77–5.28)
TIBC SERPL-MCNC: 471 MCG/DL (ref 298–536)
TRANSFERRIN SERPL-MCNC: 316 MG/DL (ref 200–360)
WBC NRBC COR # BLD: 6.95 10*3/MM3 (ref 3.4–10.8)

## 2022-03-21 PROCEDURE — 82607 VITAMIN B-12: CPT | Performed by: NURSE PRACTITIONER

## 2022-03-21 PROCEDURE — 83540 ASSAY OF IRON: CPT | Performed by: NURSE PRACTITIONER

## 2022-03-21 PROCEDURE — 85025 COMPLETE CBC W/AUTO DIFF WBC: CPT | Performed by: NURSE PRACTITIONER

## 2022-03-21 PROCEDURE — 84466 ASSAY OF TRANSFERRIN: CPT | Performed by: NURSE PRACTITIONER

## 2022-03-21 PROCEDURE — 82728 ASSAY OF FERRITIN: CPT | Performed by: NURSE PRACTITIONER

## 2022-03-21 PROCEDURE — 99214 OFFICE O/P EST MOD 30 MIN: CPT | Performed by: NURSE PRACTITIONER

## 2022-03-21 PROCEDURE — 82746 ASSAY OF FOLIC ACID SERUM: CPT | Performed by: NURSE PRACTITIONER

## 2022-03-21 RX ORDER — SODIUM CHLORIDE 9 MG/ML
250 INJECTION, SOLUTION INTRAVENOUS ONCE
Status: CANCELLED | OUTPATIENT
Start: 2022-04-11

## 2022-03-21 RX ORDER — SODIUM CHLORIDE 9 MG/ML
250 INJECTION, SOLUTION INTRAVENOUS ONCE
Status: CANCELLED | OUTPATIENT
Start: 2022-04-04

## 2022-03-21 RX ORDER — SEMAGLUTIDE 1.34 MG/ML
INJECTION, SOLUTION SUBCUTANEOUS WEEKLY
COMMUNITY
End: 2023-03-15

## 2022-03-21 RX ORDER — SODIUM CHLORIDE 9 MG/ML
250 INJECTION, SOLUTION INTRAVENOUS ONCE
Status: CANCELLED | OUTPATIENT
Start: 2022-03-21

## 2022-03-21 NOTE — PROGRESS NOTES
DATE:  3/21/2022    DIAGNOSIS: AURY    CHIEF COMPLAINT:  Increasing fatigue      TREATMENT HISTORY:  Feraheme Day 1 06/04/2021  Day 8 06/10/2021  B12 2500 mcg SL daily  Folic Acid 1 mg daily    HISTORY OF PRESENT ILLNESS:   Faby Avelar is a very pleasant 46 y.o. female who is being seen today at the request of Dorina Ball MD for evaluation and treatment of iron deficiency anemia. Ms. Avelar reports following with her PCP every 6 months with routine lab testing. Previous available CBCs were reviewed and noted microyctic anemia, Hg 11.5, in December 2020 and Hg 10.3 in April 2021. Her WBC and platelets were normal. There are no other CBCs available to review for comparison of trend. She reports that she has always struggled with AURY and has been on and off oral iron multiple times in the past. She reports today that she has been on oral iron for the last two years and has not been fully compliant due to abdominal pain, nausea and constipation caused by the supplement. She reports fatigue, shortness of breath on exertion and occasional dizziness. She reports that her periods are normal. However, she occasionally struggles with menorrhagia. She follows with GYN and has recommended annual exams. She otherwise, denies blood loss from any source, no melena, rectal bleeding or hematuria. She reports having EGD/colonoscopy in 2006 with Dr. Mak which was unremarkable. She denies history of blood transfusions. She denies any other specific complaints today.    Interval History:  Ms. Avelar presents today for follow up of AURY. She was last replaced with IV Feraheme in June 2021. She is also taking SL B12 and folic acid daily and tolerating this well. She reports that she has recently noticed worsening fatigue. She denies shortness of breath on exertion, chest pain or dizziness. She reports normal menstrual periods. Otherwise, she denies blood loss from any other source. She completed Cologuard per PCP and reports  this was negative. She denies any other specific complaints today.     The following portions of the patient's history were reviewed and updated as appropriate: allergies, current medications, past family history, past medical history, past social history, past surgical history and problem list.    PAST MEDICAL HISTORY:  Past Medical History:   Diagnosis Date   • Diabetes mellitus (HCC)    • Elevated cholesterol    • Heartburn    • Hypertension    • Low iron    • Thyroid activity decreased        PAST SURGICAL HISTORY:  Past Surgical History:   Procedure Laterality Date   • BREAST BIOPSY Right 10/29/2020    Procedure: BREAST BIOPSY WITH NEEDLE LOCALIZATION;  Surgeon: Dao Cano MD;  Location: [facility];  Service: General;  Laterality: Right;   • GALLBLADDER SURGERY     • WRIST SURGERY         SOCIAL HISTORY:  Social History     Socioeconomic History   • Marital status:    Tobacco Use   • Smoking status: Never Smoker   • Smokeless tobacco: Never Used   Vaping Use   • Vaping Use: Never used   Substance and Sexual Activity   • Alcohol use: Never   • Drug use: Never   • Sexual activity: Defer           FAMILY HISTORY:  Family History   Problem Relation Age of Onset   • COPD Mother    • Hypertension Mother    • Skin cancer Mother    • Heart disease Father    • Diabetes Father    • Hyperlipidemia Father          MEDICATIONS:  The current medication list was reviewed in the EMR    Current Outpatient Medications:   •  folic acid (FOLVITE) 1 MG tablet, Take 1 tablet by mouth Daily., Disp: 30 tablet, Rfl: 5  •  gabapentin (NEURONTIN) 300 MG capsule, TAKE 1 (ONE) CAPSULE BY MOUTH AT BEDTIME, Disp: , Rfl:   •  irbesartan (AVAPRO) 150 MG tablet, Take 150 mg by mouth Daily., Disp: , Rfl:   •  levothyroxine (SYNTHROID, LEVOTHROID) 75 MCG tablet, Take 125 mcg by mouth Daily., Disp: , Rfl:   •  Semaglutide,0.25 or 0.5MG/DOS, (Ozempic, 0.25 or 0.5 MG/DOSE,) 2 MG/1.5ML solution pen-injector, Inject  under the skin  into the appropriate area as directed 1 (One) Time Per Week., Disp: , Rfl:   •  vitamin D (ERGOCALCIFEROL) 1.25 MG (74987 UT) capsule capsule, Take 50,000 Units by mouth Daily., Disp: , Rfl:   •  acetaminophen (TYLENOL) 325 MG tablet, Take 2 tablets by mouth Every 4 (Four) Hours As Needed for Mild Pain ., Disp: 30 tablet, Rfl: 0  •  cyanocobalamin (VITAMIN B-12) 2500 MCG tablet tablet, Take 1 tablet by mouth Daily., Disp: 30 tablet, Rfl: 5  •  fenofibrate (TRICOR) 145 MG tablet, Take 145 mg by mouth Daily., Disp: , Rfl:   •  Ferrex 150 150 MG capsule, Take 150 mg by mouth Daily As Needed., Disp: , Rfl:   •  ibuprofen (ADVIL,MOTRIN) 600 MG tablet, Take 1 tablet by mouth Every 6 (Six) Hours As Needed for Mild Pain ., Disp: 30 tablet, Rfl: 0  •  Jardiance 25 MG tablet, Take 1 tablet by mouth Daily., Disp: , Rfl:   •  Ozempic, 1 MG/DOSE, 2 MG/1.5ML solution pen-injector, Inject 1 mg under the skin into the appropriate area as directed 1 (One) Time Per Week., Disp: , Rfl:   •  traMADol (ULTRAM) 50 MG tablet, Take 1 tablet by mouth Every 8 (Eight) Hours As Needed for Moderate Pain ., Disp: 6 tablet, Rfl: 0    ALLERGIES:    Allergies   Allergen Reactions   • Mushroom Other (See Comments)     PT REPORTS THROAT TINGLES AND MOUTH GET NUMB         REVIEW OF SYSTEMS:    A comprehensive 14 point review of systems was performed.  Significant findings as mentioned above.  All other systems reviewed and are negative.        Physical Exam   Vital Signs:   Vitals:    03/21/22 1359   BP: 149/97   Pulse: 72   Resp: 20   Temp: 97.5 °F (36.4 °C)   SpO2: 100%    Patient's Body mass index is 32.68 kg/m².     ECOG score: 0   General: Well developed, well nourished, alert and oriented x 3, in no acute distress.   Head: ATNC   Eyes: PERRL, No evidence of conjunctivitis.   Nose: No nasal discharge.   Mouth: Oral mucosal membranes moist. No oral ulceration or hemorrhages.   Neck: Neck supple. No thyromegaly. No JVD.   Lungs: Clear in all fields  to A&P without rales, rhonchi or wheezing.   Heart: S1, S2. Regular rate and rhythm. No murmurs, rubs, or gallops.   Abdomen: Soft. Bowel sounds are normoactive. Nontender with palpation. No Hepatosplenomegaly can be appreciated.   Extremities: No clubbing, cyanosis or edema.   Integumentary: Warm, dry, intact.   Neurologic: Grossly non-focal exam.    Pain Score:  Pain Score    03/21/22 1359   PainSc: 0-No pain       PHQ-Score Total:  PHQ-9 Total Score:         IMAGING:        RECENT LABS:  Lab Results   Component Value Date    WBC 6.95 03/21/2022    HGB 11.6 (L) 03/21/2022    HCT 37.6 03/21/2022    MCV 77.4 (L) 03/21/2022    RDW 14.4 03/21/2022     03/21/2022    NEUTRORELPCT 57.8 03/21/2022    LYMPHORELPCT 32.7 03/21/2022    MONORELPCT 6.6 03/21/2022    EOSRELPCT 2.0 03/21/2022    BASORELPCT 0.6 03/21/2022    NEUTROABS 4.02 03/21/2022    LYMPHSABS 2.27 03/21/2022       Lab Results   Component Value Date     05/03/2021    K 4.3 05/03/2021    CO2 23.6 05/03/2021     05/03/2021    BUN 11 05/03/2021    CREATININE 0.88 05/03/2021    EGFRIFNONA 70 05/03/2021    EGFRIFAFRI 125 01/29/2015    GLUCOSE 81 05/03/2021    CALCIUM 9.7 05/03/2021    ALKPHOS 34 (L) 05/03/2021    AST 24 05/03/2021    ALT 23 05/03/2021    BILITOT 0.3 05/03/2021    ALBUMIN 4.0 09/27/2021    PROTEINTOT 8.1 05/03/2021       Lab Results   Component Value Date     05/03/2021       Lab Results   Component Value Date    FERRITIN 64.73 11/19/2021    IRON 63 11/19/2021    TIBC 402 11/19/2021    LABIRON 16 (L) 11/19/2021    [id number] >2,000 (H) 09/27/2021    FOLATE >20.00 09/27/2021    HAPTOGLOBIN 120 05/03/2021    RETICCTPCT 0.90 05/03/2021    RETIC 0.0441 05/03/2021        Work up 05/03/2021      Sed Rate   0 - 20 mm/hr 8      C-Reactive Protein   0.00 - 0.50 mg/dL 0.35      Copper   80 - 158 ug/dL 139      Zinc   44 - 115 ug/dL 84      Tissue Transglutaminase IgA   0 - 3 U/mL <2          ASSESSMENT & PLAN:  Faby Avelar is a very  "katie 46 y.o. female with    1.Microcytic Anemia  2. AURY  3. B12 deficiency  4. Folate deficiency  - Previous available CBCs were reviewed and noted microyctic anemia, Hg 11.5, in December 2020 and Hg 10.3 in April 2021. Her WBC and platelets were normal. There are no other CBCs available to review for comparison of trend. She reports that she has always struggled with AURY and has been on and off oral iron multiple times in the past. She reports today that she has been on oral iron for the last two years and has not been fully compliant due to abdominal pain, nausea and constipation caused by the supplement.   - She reports that her periods are normal. However, she occasionally struggles with menorrhagia. She follows with GYN and has recommended annual exams.  - Denies blood loss from any source, no melena, rectal bleeding or hematuria. She reports having EGD/colonoscopy in 2006 with Dr. Mak which was unremarkable.   - She denies history of blood transfusions.  - CBC from initial consultation showed Hg 10.7. Iron panel and ferritin were low and most consistent with iron deficiency anemia and anemia of chronic disease/inflammation. Therefore, discontinued oral iron and replaced with IV Feraheme in June 2021.   - Also sent additional labs to further evaluate including PBS (summarized above) which showed microcytic, hypochromic anemia with anisocytosis including \"pencil\" cells, compatible with the provided history of AURY, normal WBC and differential, mature WBC morphology with no granulocytic dysplasia or blasts identified, adequate platelets. B12 and folate were low and she was started on B12 SL 2500 mcg daily and folic acid 1 mg daily. No evidence of hemolysis as Retic, LDH and Haptoglobin were normal. ESR and CRP normal.  Copper, zinc and tissue transglutaminase were normal. To further evaluate anemia obtained serum LINDA which showed no M-spike and serum free light chains revealed normal kappa/lambda ratio.  - " Cologuard testing per PCP in January 2022 was negative per patient.   - Repeat CBC from today is showing Hg ~11.6, stable. Iron and ferritin are marginally low. Therefore, will replace with IV Feraheme, pending insurance approval.    - Will follow up in 3 months with repeat labs. In the interim, she is aware to call if she were to develop increasing weakness/fatigue, shortness of breath on exertion, chest pain or dizziness and we will be happy to check labs sooner.     ACO / TY/Other  Quality measures  -  Faby Avelar received 2021 flu vaccine.  -  Faby Avelar reports a pain score of 0.    -  Current outpatient and discharge medications have been reconciled for the patient.  Reviewed by: JOHNY Ivan        A total of 30 minutes were spent coordinating this patient’s care in clinic today; more than 50% of this time was face-to-face with the patient, reviewing her interim medical history and counseling on the current treatment and followup plan. All questions were answered to her satisfaction.          Electronically Signed by: JOHNY Restrepo APRN March 21, 2022 14:09 EDT       CC:   No ref. provider found  Dorina Ball MD

## 2022-03-22 LAB
FOLATE SERPL-MCNC: >20 NG/ML (ref 4.78–24.2)
VIT B12 BLD-MCNC: 1982 PG/ML (ref 211–946)

## 2022-03-29 ENCOUNTER — TELEPHONE (OUTPATIENT)
Dept: ONCOLOGY | Facility: CLINIC | Age: 47
End: 2022-03-29

## 2022-03-29 NOTE — TELEPHONE ENCOUNTER
Caller: Faby Avelar    Relationship: Self    Best call back number: [phone]      What was the call regarding: PATIENT CALLED TO CHECK ON IRON INFUSIONS BEING SCHEDULED     Do you require a callback: YES

## 2022-04-01 ENCOUNTER — APPOINTMENT (OUTPATIENT)
Dept: ONCOLOGY | Facility: HOSPITAL | Age: 47
End: 2022-04-01

## 2022-04-04 ENCOUNTER — INFUSION (OUTPATIENT)
Dept: ONCOLOGY | Facility: HOSPITAL | Age: 47
End: 2022-04-04

## 2022-04-04 VITALS
HEART RATE: 76 BPM | OXYGEN SATURATION: 98 % | TEMPERATURE: 98.2 F | RESPIRATION RATE: 18 BRPM | WEIGHT: 191.1 LBS | SYSTOLIC BLOOD PRESSURE: 140 MMHG | BODY MASS INDEX: 32.8 KG/M2 | DIASTOLIC BLOOD PRESSURE: 92 MMHG

## 2022-04-04 DIAGNOSIS — K90.9 INTESTINAL MALABSORPTION, UNSPECIFIED TYPE: Primary | ICD-10-CM

## 2022-04-04 DIAGNOSIS — D50.9 IRON DEFICIENCY ANEMIA, UNSPECIFIED IRON DEFICIENCY ANEMIA TYPE: ICD-10-CM

## 2022-04-04 PROCEDURE — 96365 THER/PROPH/DIAG IV INF INIT: CPT

## 2022-04-04 PROCEDURE — 96374 THER/PROPH/DIAG INJ IV PUSH: CPT

## 2022-04-04 PROCEDURE — 25010000002 FERUMOXYTOL 510 MG/17ML SOLUTION 17 ML VIAL: Performed by: NURSE PRACTITIONER

## 2022-04-04 RX ORDER — SODIUM CHLORIDE 9 MG/ML
250 INJECTION, SOLUTION INTRAVENOUS ONCE
Status: COMPLETED | OUTPATIENT
Start: 2022-04-04 | End: 2022-04-04

## 2022-04-04 RX ADMIN — FERUMOXYTOL 510 MG: 510 INJECTION INTRAVENOUS at 09:42

## 2022-04-04 RX ADMIN — SODIUM CHLORIDE 250 ML: 9 INJECTION, SOLUTION INTRAVENOUS at 09:42

## 2022-04-08 ENCOUNTER — APPOINTMENT (OUTPATIENT)
Dept: ONCOLOGY | Facility: HOSPITAL | Age: 47
End: 2022-04-08

## 2022-04-11 ENCOUNTER — INFUSION (OUTPATIENT)
Dept: ONCOLOGY | Facility: HOSPITAL | Age: 47
End: 2022-04-11

## 2022-04-11 VITALS
WEIGHT: 188.4 LBS | HEART RATE: 84 BPM | DIASTOLIC BLOOD PRESSURE: 86 MMHG | BODY MASS INDEX: 32.34 KG/M2 | OXYGEN SATURATION: 97 % | TEMPERATURE: 97.5 F | SYSTOLIC BLOOD PRESSURE: 128 MMHG | RESPIRATION RATE: 18 BRPM

## 2022-04-11 DIAGNOSIS — K90.9 INTESTINAL MALABSORPTION, UNSPECIFIED TYPE: Primary | ICD-10-CM

## 2022-04-11 DIAGNOSIS — D50.9 IRON DEFICIENCY ANEMIA, UNSPECIFIED IRON DEFICIENCY ANEMIA TYPE: ICD-10-CM

## 2022-04-11 PROCEDURE — 96374 THER/PROPH/DIAG INJ IV PUSH: CPT

## 2022-04-11 PROCEDURE — 25010000002 FERUMOXYTOL 510 MG/17ML SOLUTION 17 ML VIAL: Performed by: NURSE PRACTITIONER

## 2022-04-11 RX ORDER — SODIUM CHLORIDE 9 MG/ML
250 INJECTION, SOLUTION INTRAVENOUS ONCE
Status: COMPLETED | OUTPATIENT
Start: 2022-04-11 | End: 2022-04-11

## 2022-04-11 RX ADMIN — FERUMOXYTOL 510 MG: 510 INJECTION INTRAVENOUS at 12:06

## 2022-04-11 RX ADMIN — SODIUM CHLORIDE 250 ML: 9 INJECTION, SOLUTION INTRAVENOUS at 12:05

## 2022-06-07 RX ORDER — FOLIC ACID 1 MG/1
1 TABLET ORAL DAILY
Qty: 30 TABLET | Refills: 5 | Status: SHIPPED | OUTPATIENT
Start: 2022-06-07 | End: 2023-01-19 | Stop reason: SDUPTHER

## 2022-06-22 ENCOUNTER — LAB (OUTPATIENT)
Dept: ONCOLOGY | Facility: CLINIC | Age: 47
End: 2022-06-22

## 2022-06-22 ENCOUNTER — OFFICE VISIT (OUTPATIENT)
Dept: ONCOLOGY | Facility: CLINIC | Age: 47
End: 2022-06-22

## 2022-06-22 VITALS
WEIGHT: 188.4 LBS | OXYGEN SATURATION: 98 % | DIASTOLIC BLOOD PRESSURE: 81 MMHG | SYSTOLIC BLOOD PRESSURE: 127 MMHG | HEIGHT: 64 IN | RESPIRATION RATE: 18 BRPM | BODY MASS INDEX: 32.17 KG/M2 | TEMPERATURE: 97.7 F | HEART RATE: 77 BPM

## 2022-06-22 DIAGNOSIS — K90.9 INTESTINAL MALABSORPTION, UNSPECIFIED TYPE: ICD-10-CM

## 2022-06-22 DIAGNOSIS — D50.9 IRON DEFICIENCY ANEMIA, UNSPECIFIED IRON DEFICIENCY ANEMIA TYPE: ICD-10-CM

## 2022-06-22 DIAGNOSIS — E53.8 FOLATE DEFICIENCY: ICD-10-CM

## 2022-06-22 DIAGNOSIS — E53.8 B12 DEFICIENCY: ICD-10-CM

## 2022-06-22 DIAGNOSIS — K90.9 INTESTINAL MALABSORPTION, UNSPECIFIED TYPE: Primary | ICD-10-CM

## 2022-06-22 LAB
BASOPHILS # BLD AUTO: 0.02 10*3/MM3 (ref 0–0.2)
BASOPHILS NFR BLD AUTO: 0.3 % (ref 0–1.5)
DEPRECATED RDW RBC AUTO: 43.8 FL (ref 37–54)
EOSINOPHIL # BLD AUTO: 0.13 10*3/MM3 (ref 0–0.4)
EOSINOPHIL NFR BLD AUTO: 1.8 % (ref 0.3–6.2)
ERYTHROCYTE [DISTWIDTH] IN BLOOD BY AUTOMATED COUNT: 15.1 % (ref 12.3–15.4)
FERRITIN SERPL-MCNC: 190 NG/ML (ref 13–150)
HCT VFR BLD AUTO: 37.6 % (ref 34–46.6)
HGB BLD-MCNC: 11.9 G/DL (ref 12–15.9)
IMM GRANULOCYTES # BLD AUTO: 0.02 10*3/MM3 (ref 0–0.05)
IMM GRANULOCYTES NFR BLD AUTO: 0.3 % (ref 0–0.5)
IRON 24H UR-MRATE: 64 MCG/DL (ref 37–145)
IRON SATN MFR SERPL: 19 % (ref 20–50)
LYMPHOCYTES # BLD AUTO: 1.71 10*3/MM3 (ref 0.7–3.1)
LYMPHOCYTES NFR BLD AUTO: 23.8 % (ref 19.6–45.3)
MCH RBC QN AUTO: 25.4 PG (ref 26.6–33)
MCHC RBC AUTO-ENTMCNC: 31.6 G/DL (ref 31.5–35.7)
MCV RBC AUTO: 80.3 FL (ref 79–97)
MONOCYTES # BLD AUTO: 0.58 10*3/MM3 (ref 0.1–0.9)
MONOCYTES NFR BLD AUTO: 8.1 % (ref 5–12)
NEUTROPHILS NFR BLD AUTO: 4.74 10*3/MM3 (ref 1.7–7)
NEUTROPHILS NFR BLD AUTO: 65.7 % (ref 42.7–76)
NRBC BLD AUTO-RTO: 0 /100 WBC (ref 0–0.2)
PLATELET # BLD AUTO: 282 10*3/MM3 (ref 140–450)
PMV BLD AUTO: 9.8 FL (ref 6–12)
RBC # BLD AUTO: 4.68 10*6/MM3 (ref 3.77–5.28)
TIBC SERPL-MCNC: 346 MCG/DL (ref 298–536)
TRANSFERRIN SERPL-MCNC: 232 MG/DL (ref 200–360)
WBC NRBC COR # BLD: 7.2 10*3/MM3 (ref 3.4–10.8)

## 2022-06-22 PROCEDURE — 82728 ASSAY OF FERRITIN: CPT | Performed by: NURSE PRACTITIONER

## 2022-06-22 PROCEDURE — 84466 ASSAY OF TRANSFERRIN: CPT | Performed by: NURSE PRACTITIONER

## 2022-06-22 PROCEDURE — 83540 ASSAY OF IRON: CPT | Performed by: NURSE PRACTITIONER

## 2022-06-22 PROCEDURE — 85025 COMPLETE CBC W/AUTO DIFF WBC: CPT | Performed by: NURSE PRACTITIONER

## 2022-06-22 PROCEDURE — 99214 OFFICE O/P EST MOD 30 MIN: CPT | Performed by: NURSE PRACTITIONER

## 2022-06-22 RX ORDER — SEMAGLUTIDE 1.34 MG/ML
INJECTION, SOLUTION SUBCUTANEOUS WEEKLY
COMMUNITY
End: 2023-03-15

## 2022-06-22 NOTE — PROGRESS NOTES
DATE:  6/22/2022    DIAGNOSIS: AURY    CHIEF COMPLAINT:  Follow up AURY      TREATMENT HISTORY:  Feraheme Day 1 06/04/2021  Day 8 06/10/2021  Feraheme Day 1 04/04/2022  Day 8 04/11/2022    B12 2500 mcg SL daily  Folic Acid 1 mg daily    HISTORY OF PRESENT ILLNESS:   Faby Avelar is a very pleasant 46 y.o. female who is being seen today at the request of Dorina Ball MD for evaluation and treatment of iron deficiency anemia. Ms. Avelar reports following with her PCP every 6 months with routine lab testing. Previous available CBCs were reviewed and noted microyctic anemia, Hg 11.5, in December 2020 and Hg 10.3 in April 2021. Her WBC and platelets were normal. There are no other CBCs available to review for comparison of trend. She reports that she has always struggled with AURY and has been on and off oral iron multiple times in the past. She reports today that she has been on oral iron for the last two years and has not been fully compliant due to abdominal pain, nausea and constipation caused by the supplement. She reports fatigue, shortness of breath on exertion and occasional dizziness. She reports that her periods are normal. However, she occasionally struggles with menorrhagia. She follows with GYN and has recommended annual exams. She otherwise, denies blood loss from any source, no melena, rectal bleeding or hematuria. She reports having EGD/colonoscopy in 2006 with Dr. Mak which was unremarkable. She denies history of blood transfusions. She denies any other specific complaints today.    Interval History:  Ms. Avelar presents today for follow up of AURY. She was last replaced with IV Feraheme in April 2022. She is also taking SL B12 and folic acid daily and tolerating this well. Overall, she reports that her fatigue has improved since receiving IV iron. She denies shortness of breath on exertion, chest pain or dizziness. She reports normal menstrual periods. Otherwise, she denies blood loss from any  other source. She denies any other specific complaints today.     The following portions of the patient's history were reviewed and updated as appropriate: allergies, current medications, past family history, past medical history, past social history, past surgical history and problem list.    PAST MEDICAL HISTORY:  Past Medical History:   Diagnosis Date   • Diabetes mellitus (HCC)    • Elevated cholesterol    • Heartburn    • Hypertension    • Low iron    • Thyroid activity decreased        PAST SURGICAL HISTORY:  Past Surgical History:   Procedure Laterality Date   • BREAST BIOPSY Right 10/29/2020    Procedure: BREAST BIOPSY WITH NEEDLE LOCALIZATION;  Surgeon: Dao Cano MD;  Location: [facility];  Service: General;  Laterality: Right;   • GALLBLADDER SURGERY     • WRIST SURGERY         SOCIAL HISTORY:  Social History     Socioeconomic History   • Marital status:    Tobacco Use   • Smoking status: Never Smoker   • Smokeless tobacco: Never Used   Vaping Use   • Vaping Use: Never used   Substance and Sexual Activity   • Alcohol use: Never   • Drug use: Never   • Sexual activity: Defer           FAMILY HISTORY:  Family History   Problem Relation Age of Onset   • COPD Mother    • Hypertension Mother    • Skin cancer Mother    • Heart disease Father    • Diabetes Father    • Hyperlipidemia Father          MEDICATIONS:  The current medication list was reviewed in the EMR    Current Outpatient Medications:   •  folic acid (FOLVITE) 1 MG tablet, Take 1 tablet by mouth Daily., Disp: 30 tablet, Rfl: 5  •  gabapentin (NEURONTIN) 300 MG capsule, TAKE 1 (ONE) CAPSULE BY MOUTH AT BEDTIME, Disp: , Rfl:   •  irbesartan (AVAPRO) 150 MG tablet, Take 150 mg by mouth Daily., Disp: , Rfl:   •  levothyroxine (SYNTHROID, LEVOTHROID) 75 MCG tablet, Take 125 mcg by mouth Daily., Disp: , Rfl:   •  Semaglutide, 1 MG/DOSE, (Ozempic, 1 MG/DOSE,) 2 MG/1.5ML solution pen-injector, Inject  under the skin into the appropriate  area as directed 1 (One) Time Per Week., Disp: , Rfl:   •  Semaglutide,0.25 or 0.5MG/DOS, (Ozempic, 0.25 or 0.5 MG/DOSE,) 2 MG/1.5ML solution pen-injector, Inject  under the skin into the appropriate area as directed 1 (One) Time Per Week., Disp: , Rfl:   •  vitamin D (ERGOCALCIFEROL) 1.25 MG (44890 UT) capsule capsule, Take 50,000 Units by mouth Daily., Disp: , Rfl:   •  acetaminophen (TYLENOL) 325 MG tablet, Take 2 tablets by mouth Every 4 (Four) Hours As Needed for Mild Pain ., Disp: 30 tablet, Rfl: 0  •  cyanocobalamin (VITAMIN B-12) 2500 MCG tablet tablet, Take 1 tablet by mouth Daily., Disp: 30 tablet, Rfl: 5  •  fenofibrate (TRICOR) 145 MG tablet, Take 145 mg by mouth Daily., Disp: , Rfl:   •  Ferrex 150 150 MG capsule, Take 150 mg by mouth Daily As Needed., Disp: , Rfl:   •  ibuprofen (ADVIL,MOTRIN) 600 MG tablet, Take 1 tablet by mouth Every 6 (Six) Hours As Needed for Mild Pain ., Disp: 30 tablet, Rfl: 0  •  Jardiance 25 MG tablet, Take 1 tablet by mouth Daily., Disp: , Rfl:   •  Ozempic, 1 MG/DOSE, 2 MG/1.5ML solution pen-injector, Inject 1 mg under the skin into the appropriate area as directed 1 (One) Time Per Week., Disp: , Rfl:   •  traMADol (ULTRAM) 50 MG tablet, Take 1 tablet by mouth Every 8 (Eight) Hours As Needed for Moderate Pain ., Disp: 6 tablet, Rfl: 0    ALLERGIES:    Allergies   Allergen Reactions   • Mushroom Other (See Comments)     PT REPORTS THROAT TINGLES AND MOUTH GET NUMB         REVIEW OF SYSTEMS:    A comprehensive 14 point review of systems was performed.  Significant findings as mentioned above.  All other systems reviewed and are negative.        Physical Exam   Vital Signs:   Vitals:    06/22/22 0826   BP: 127/81   Pulse: 77   Resp: 18   Temp: 97.7 °F (36.5 °C)   SpO2: 98%    Patient's Body mass index is 32.34 kg/m².     ECOG score: 0   General: Well developed, well nourished, alert and oriented x 3, in no acute distress.   Head: ATNC   Eyes: PERRL, No evidence of  conjunctivitis.   Nose: No nasal discharge.   Mouth: Oral mucosal membranes moist. No oral ulceration or hemorrhages.   Neck: Neck supple. No thyromegaly. No JVD.   Lungs: Clear in all fields to A&P without rales, rhonchi or wheezing.   Heart: S1, S2. Regular rate and rhythm. No murmurs, rubs, or gallops.   Abdomen: Soft. Bowel sounds are normoactive. Nontender with palpation. No Hepatosplenomegaly can be appreciated.   Extremities: No clubbing, cyanosis or edema.   Integumentary: Warm, dry, intact.   Neurologic: Grossly non-focal exam.    Pain Score:  Pain Score    06/22/22 0826   PainSc: 0-No pain       PHQ-Score Total:  PHQ-9 Total Score:         IMAGING:        RECENT LABS:  Lab Results   Component Value Date    WBC 7.20 06/22/2022    HGB 11.9 (L) 06/22/2022    HCT 37.6 06/22/2022    MCV 80.3 06/22/2022    RDW 15.1 06/22/2022     06/22/2022    NEUTRORELPCT 65.7 06/22/2022    LYMPHORELPCT 23.8 06/22/2022    MONORELPCT 8.1 06/22/2022    EOSRELPCT 1.8 06/22/2022    BASORELPCT 0.3 06/22/2022    NEUTROABS 4.74 06/22/2022    LYMPHSABS 1.71 06/22/2022       Lab Results   Component Value Date     05/03/2021    K 4.3 05/03/2021    CO2 23.6 05/03/2021     05/03/2021    BUN 11 05/03/2021    CREATININE 0.88 05/03/2021    EGFRIFNONA 70 05/03/2021    EGFRIFAFRI 125 01/29/2015    GLUCOSE 81 05/03/2021    CALCIUM 9.7 05/03/2021    ALKPHOS 34 (L) 05/03/2021    AST 24 05/03/2021    ALT 23 05/03/2021    BILITOT 0.3 05/03/2021    ALBUMIN 4.0 09/27/2021    PROTEINTOT 8.1 05/03/2021       Lab Results   Component Value Date     05/03/2021       Lab Results   Component Value Date    FERRITIN 190.00 (H) 06/22/2022    IRON 64 06/22/2022    TIBC 346 06/22/2022    LABIRON 19 (L) 06/22/2022    [id number] 1,982 (H) 03/21/2022    FOLATE >20.00 03/21/2022    HAPTOGLOBIN 120 05/03/2021    RETICCTPCT 0.90 05/03/2021    RETIC 0.0441 05/03/2021        Work up 05/03/2021      Sed Rate   0 - 20 mm/hr 8      C-Reactive  "Protein   0.00 - 0.50 mg/dL 0.35      Copper   80 - 158 ug/dL 139      Zinc   44 - 115 ug/dL 84      Tissue Transglutaminase IgA   0 - 3 U/mL <2          ASSESSMENT & PLAN:  Faby Avelar is a very pleasant 46 y.o. female with    1.Microcytic Anemia  2. AURY  3. B12 deficiency  4. Folate deficiency  - Previous available CBCs were reviewed and noted microyctic anemia, Hg 11.5, in December 2020 and Hg 10.3 in April 2021. Her WBC and platelets were normal. There are no other CBCs available to review for comparison of trend. She reports that she has always struggled with AURY and has been on and off oral iron multiple times in the past. She reports today that she has been on oral iron for the last two years and has not been fully compliant due to abdominal pain, nausea and constipation caused by the supplement.   - She reports that her periods are normal. However, she occasionally struggles with menorrhagia. She follows with GYN and has recommended annual exams.  - Denies blood loss from any source, no melena, rectal bleeding or hematuria. She reports having EGD/colonoscopy in 2006 with Dr. Mak which was unremarkable.   - She denies history of blood transfusions.  - CBC from initial consultation showed Hg 10.7. Iron panel and ferritin were low and most consistent with iron deficiency anemia and anemia of chronic disease/inflammation. Therefore, discontinued oral iron and replaced with IV Feraheme in June 2021.   - Also sent additional labs to further evaluate including PBS (summarized above) which showed microcytic, hypochromic anemia with anisocytosis including \"pencil\" cells, compatible with the provided history of AURY, normal WBC and differential, mature WBC morphology with no granulocytic dysplasia or blasts identified, adequate platelets. B12 and folate were low and she was started on B12 SL 2500 mcg daily and folic acid 1 mg daily. No evidence of hemolysis as Retic, LDH and Haptoglobin were normal. ESR and CRP " normal.  Copper, zinc and tissue transglutaminase were normal. To further evaluate anemia obtained serum LINDA which showed no M-spike and serum free light chains revealed normal kappa/lambda ratio.  - Cologuard testing per PCP in January 2022 was negative per patient.   - Repeat CBC from today is showing Hg ~11.9, stable/improved. Iron and ferritin are replete.   - Will repeat labs in 2 months and follow up in 4 months with repeat labs. Will replace with IV Feraheme as needed. In the interim, she is aware to call if she were to develop increasing weakness/fatigue, shortness of breath on exertion, chest pain or dizziness and we will be happy to check labs sooner.     ACO / TY/Other  Quality measures  -  Faby Avelar received 2021 flu vaccine.  -  Faby Avelar reports a pain score of 0.   -  Current outpatient and discharge medications have been reconciled for the patient.  Reviewed by: JOHNY Ivan        A total of 30 minutes were spent coordinating this patient’s care in clinic today; more than 50% of this time was face-to-face with the patient, reviewing her interim medical history and counseling on the current treatment and followup plan. All questions were answered to her satisfaction.          Electronically Signed by: JOHNY Restrepo APRN June 22, 2022 09:36 EDT       CC:   No ref. provider found  Dorina Ball MD

## 2022-08-22 ENCOUNTER — LAB (OUTPATIENT)
Dept: ONCOLOGY | Facility: CLINIC | Age: 47
End: 2022-08-22

## 2022-08-22 DIAGNOSIS — D50.9 IRON DEFICIENCY ANEMIA, UNSPECIFIED IRON DEFICIENCY ANEMIA TYPE: ICD-10-CM

## 2022-08-22 DIAGNOSIS — K90.9 INTESTINAL MALABSORPTION, UNSPECIFIED TYPE: ICD-10-CM

## 2022-08-22 DIAGNOSIS — E53.8 B12 DEFICIENCY: ICD-10-CM

## 2022-08-22 DIAGNOSIS — E53.8 FOLATE DEFICIENCY: ICD-10-CM

## 2022-08-22 LAB
BASOPHILS # BLD AUTO: 0.04 10*3/MM3 (ref 0–0.2)
BASOPHILS NFR BLD AUTO: 0.4 % (ref 0–1.5)
DEPRECATED RDW RBC AUTO: 40.4 FL (ref 37–54)
EOSINOPHIL # BLD AUTO: 0.15 10*3/MM3 (ref 0–0.4)
EOSINOPHIL NFR BLD AUTO: 1.6 % (ref 0.3–6.2)
ERYTHROCYTE [DISTWIDTH] IN BLOOD BY AUTOMATED COUNT: 13.3 % (ref 12.3–15.4)
FERRITIN SERPL-MCNC: 146.8 NG/ML (ref 13–150)
FOLATE SERPL-MCNC: >20 NG/ML (ref 4.78–24.2)
HCT VFR BLD AUTO: 41.6 % (ref 34–46.6)
HGB BLD-MCNC: 12.8 G/DL (ref 12–15.9)
IMM GRANULOCYTES # BLD AUTO: 0.05 10*3/MM3 (ref 0–0.05)
IMM GRANULOCYTES NFR BLD AUTO: 0.5 % (ref 0–0.5)
IRON 24H UR-MRATE: 60 MCG/DL (ref 37–145)
IRON SATN MFR SERPL: 15 % (ref 20–50)
LYMPHOCYTES # BLD AUTO: 1.94 10*3/MM3 (ref 0.7–3.1)
LYMPHOCYTES NFR BLD AUTO: 20.7 % (ref 19.6–45.3)
MCH RBC QN AUTO: 25.7 PG (ref 26.6–33)
MCHC RBC AUTO-ENTMCNC: 30.8 G/DL (ref 31.5–35.7)
MCV RBC AUTO: 83.5 FL (ref 79–97)
MONOCYTES # BLD AUTO: 0.55 10*3/MM3 (ref 0.1–0.9)
MONOCYTES NFR BLD AUTO: 5.9 % (ref 5–12)
NEUTROPHILS NFR BLD AUTO: 6.62 10*3/MM3 (ref 1.7–7)
NEUTROPHILS NFR BLD AUTO: 70.9 % (ref 42.7–76)
NRBC BLD AUTO-RTO: 0 /100 WBC (ref 0–0.2)
PLATELET # BLD AUTO: 301 10*3/MM3 (ref 140–450)
PMV BLD AUTO: 9.7 FL (ref 6–12)
RBC # BLD AUTO: 4.98 10*6/MM3 (ref 3.77–5.28)
TIBC SERPL-MCNC: 395 MCG/DL (ref 298–536)
TRANSFERRIN SERPL-MCNC: 265 MG/DL (ref 200–360)
VIT B12 BLD-MCNC: 921 PG/ML (ref 211–946)
WBC NRBC COR # BLD: 9.35 10*3/MM3 (ref 3.4–10.8)

## 2022-08-22 PROCEDURE — 84466 ASSAY OF TRANSFERRIN: CPT | Performed by: NURSE PRACTITIONER

## 2022-08-22 PROCEDURE — 83540 ASSAY OF IRON: CPT | Performed by: NURSE PRACTITIONER

## 2022-08-22 PROCEDURE — 85025 COMPLETE CBC W/AUTO DIFF WBC: CPT | Performed by: NURSE PRACTITIONER

## 2022-08-22 PROCEDURE — 82728 ASSAY OF FERRITIN: CPT | Performed by: NURSE PRACTITIONER

## 2022-08-22 PROCEDURE — 82607 VITAMIN B-12: CPT | Performed by: NURSE PRACTITIONER

## 2022-08-22 PROCEDURE — 82746 ASSAY OF FOLIC ACID SERUM: CPT | Performed by: NURSE PRACTITIONER

## 2022-11-07 ENCOUNTER — LAB (OUTPATIENT)
Dept: ONCOLOGY | Facility: CLINIC | Age: 47
End: 2022-11-07

## 2022-11-07 ENCOUNTER — OFFICE VISIT (OUTPATIENT)
Dept: ONCOLOGY | Facility: CLINIC | Age: 47
End: 2022-11-07

## 2022-11-07 VITALS
DIASTOLIC BLOOD PRESSURE: 88 MMHG | HEART RATE: 84 BPM | HEIGHT: 64 IN | BODY MASS INDEX: 31.96 KG/M2 | RESPIRATION RATE: 18 BRPM | OXYGEN SATURATION: 99 % | TEMPERATURE: 97.1 F | WEIGHT: 187.2 LBS | SYSTOLIC BLOOD PRESSURE: 143 MMHG

## 2022-11-07 DIAGNOSIS — K90.9 INTESTINAL MALABSORPTION, UNSPECIFIED TYPE: ICD-10-CM

## 2022-11-07 DIAGNOSIS — E53.8 B12 DEFICIENCY: ICD-10-CM

## 2022-11-07 DIAGNOSIS — D50.9 IRON DEFICIENCY ANEMIA, UNSPECIFIED IRON DEFICIENCY ANEMIA TYPE: ICD-10-CM

## 2022-11-07 DIAGNOSIS — E53.8 FOLATE DEFICIENCY: ICD-10-CM

## 2022-11-07 DIAGNOSIS — K90.9 INTESTINAL MALABSORPTION, UNSPECIFIED TYPE: Primary | ICD-10-CM

## 2022-11-07 LAB
BASOPHILS # BLD AUTO: 0.03 10*3/MM3 (ref 0–0.2)
BASOPHILS NFR BLD AUTO: 0.4 % (ref 0–1.5)
DEPRECATED RDW RBC AUTO: 39.6 FL (ref 37–54)
EOSINOPHIL # BLD AUTO: 0.17 10*3/MM3 (ref 0–0.4)
EOSINOPHIL NFR BLD AUTO: 2.2 % (ref 0.3–6.2)
ERYTHROCYTE [DISTWIDTH] IN BLOOD BY AUTOMATED COUNT: 13.5 % (ref 12.3–15.4)
FERRITIN SERPL-MCNC: 122 NG/ML (ref 13–150)
HCT VFR BLD AUTO: 38.8 % (ref 34–46.6)
HGB BLD-MCNC: 12.2 G/DL (ref 12–15.9)
IMM GRANULOCYTES # BLD AUTO: 0.02 10*3/MM3 (ref 0–0.05)
IMM GRANULOCYTES NFR BLD AUTO: 0.3 % (ref 0–0.5)
IRON 24H UR-MRATE: 47 MCG/DL (ref 37–145)
IRON SATN MFR SERPL: 12 % (ref 20–50)
LYMPHOCYTES # BLD AUTO: 2.11 10*3/MM3 (ref 0.7–3.1)
LYMPHOCYTES NFR BLD AUTO: 26.8 % (ref 19.6–45.3)
MCH RBC QN AUTO: 25.5 PG (ref 26.6–33)
MCHC RBC AUTO-ENTMCNC: 31.4 G/DL (ref 31.5–35.7)
MCV RBC AUTO: 81.2 FL (ref 79–97)
MONOCYTES # BLD AUTO: 0.48 10*3/MM3 (ref 0.1–0.9)
MONOCYTES NFR BLD AUTO: 6.1 % (ref 5–12)
NEUTROPHILS NFR BLD AUTO: 5.07 10*3/MM3 (ref 1.7–7)
NEUTROPHILS NFR BLD AUTO: 64.2 % (ref 42.7–76)
NRBC BLD AUTO-RTO: 0 /100 WBC (ref 0–0.2)
PLATELET # BLD AUTO: 283 10*3/MM3 (ref 140–450)
PMV BLD AUTO: 9.8 FL (ref 6–12)
RBC # BLD AUTO: 4.78 10*6/MM3 (ref 3.77–5.28)
TIBC SERPL-MCNC: 380 MCG/DL (ref 298–536)
TRANSFERRIN SERPL-MCNC: 255 MG/DL (ref 200–360)
WBC NRBC COR # BLD: 7.88 10*3/MM3 (ref 3.4–10.8)

## 2022-11-07 PROCEDURE — 85025 COMPLETE CBC W/AUTO DIFF WBC: CPT | Performed by: NURSE PRACTITIONER

## 2022-11-07 PROCEDURE — 83540 ASSAY OF IRON: CPT | Performed by: NURSE PRACTITIONER

## 2022-11-07 PROCEDURE — 99214 OFFICE O/P EST MOD 30 MIN: CPT | Performed by: NURSE PRACTITIONER

## 2022-11-07 PROCEDURE — 84466 ASSAY OF TRANSFERRIN: CPT | Performed by: NURSE PRACTITIONER

## 2022-11-07 PROCEDURE — 82728 ASSAY OF FERRITIN: CPT | Performed by: NURSE PRACTITIONER

## 2022-11-07 PROCEDURE — 36415 COLL VENOUS BLD VENIPUNCTURE: CPT | Performed by: NURSE PRACTITIONER

## 2022-11-07 RX ORDER — LEVOCETIRIZINE DIHYDROCHLORIDE 5 MG/1
5 TABLET, FILM COATED ORAL EVERY EVENING
COMMUNITY

## 2022-11-07 RX ORDER — MONTELUKAST SODIUM 10 MG/1
10 TABLET ORAL NIGHTLY
COMMUNITY

## 2022-11-07 NOTE — PROGRESS NOTES
DATE:  11/7/2022    DIAGNOSIS: AURY    CHIEF COMPLAINT:  Follow up AURY      TREATMENT HISTORY:  Feraheme Day 1 06/04/2021  Day 8 06/10/2021  Feraheme Day 1 04/04/2022  Day 8 04/11/2022    B12 2500 mcg SL daily  Folic Acid 1 mg daily    HISTORY OF PRESENT ILLNESS:   Faby Avelar is a very pleasant 47 y.o. female who is being seen today at the request of Dorina Ball MD for evaluation and treatment of iron deficiency anemia. Ms. Avelar reports following with her PCP every 6 months with routine lab testing. Previous available CBCs were reviewed and noted microyctic anemia, Hg 11.5, in December 2020 and Hg 10.3 in April 2021. Her WBC and platelets were normal. There are no other CBCs available to review for comparison of trend. She reports that she has always struggled with AURY and has been on and off oral iron multiple times in the past. She reports today that she has been on oral iron for the last two years and has not been fully compliant due to abdominal pain, nausea and constipation caused by the supplement. She reports fatigue, shortness of breath on exertion and occasional dizziness. She reports that her periods are normal. However, she occasionally struggles with menorrhagia. She follows with GYN and has recommended annual exams. She otherwise, denies blood loss from any source, no melena, rectal bleeding or hematuria. She reports having EGD/colonoscopy in 2006 with Dr. Mak which was unremarkable. She denies history of blood transfusions. She denies any other specific complaints today.    Interval History:  Ms. Avelar presents today for follow up of AURY. She was last replaced with IV Feraheme in April 2022. She is also taking SL B12 and folic acid daily and tolerating this well. She reports improvement in fatigue. She denies shortness of breath on exertion, chest pain or dizziness. She reports normal menstrual periods. Otherwise, she denies blood loss from any other source. She denies any other  specific complaints today.     The following portions of the patient's history were reviewed and updated as appropriate: allergies, current medications, past family history, past medical history, past social history, past surgical history and problem list.    PAST MEDICAL HISTORY:  Past Medical History:   Diagnosis Date   • Diabetes mellitus (HCC)    • Elevated cholesterol    • Heartburn    • Hypertension    • Low iron    • Thyroid activity decreased        PAST SURGICAL HISTORY:  Past Surgical History:   Procedure Laterality Date   • BREAST BIOPSY Right 10/29/2020    Procedure: BREAST BIOPSY WITH NEEDLE LOCALIZATION;  Surgeon: Dao Cano MD;  Location: [facility];  Service: General;  Laterality: Right;   • GALLBLADDER SURGERY     • WRIST SURGERY         SOCIAL HISTORY:  Social History     Socioeconomic History   • Marital status:    Tobacco Use   • Smoking status: Never   • Smokeless tobacco: Never   Vaping Use   • Vaping Use: Never used   Substance and Sexual Activity   • Alcohol use: Never   • Drug use: Never   • Sexual activity: Defer           FAMILY HISTORY:  Family History   Problem Relation Age of Onset   • COPD Mother    • Hypertension Mother    • Skin cancer Mother    • Heart disease Father    • Diabetes Father    • Hyperlipidemia Father          MEDICATIONS:  The current medication list was reviewed in the EMR    Current Outpatient Medications:   •  cyanocobalamin (VITAMIN B-12) 2500 MCG tablet tablet, Take 1 tablet by mouth Daily., Disp: 30 tablet, Rfl: 5  •  folic acid (FOLVITE) 1 MG tablet, Take 1 tablet by mouth Daily., Disp: 30 tablet, Rfl: 5  •  gabapentin (NEURONTIN) 300 MG capsule, TAKE 1 (ONE) CAPSULE BY MOUTH AT BEDTIME, Disp: , Rfl:   •  irbesartan (AVAPRO) 150 MG tablet, Take 150 mg by mouth Daily., Disp: , Rfl:   •  levocetirizine (XYZAL) 5 MG tablet, Take 1 tablet by mouth Every Evening., Disp: , Rfl:   •  levothyroxine (SYNTHROID, LEVOTHROID) 75 MCG tablet, Take 125 mcg by  mouth Daily., Disp: , Rfl:   •  montelukast (SINGULAIR) 10 MG tablet, Take 1 tablet by mouth Every Night., Disp: , Rfl:   •  Semaglutide, 1 MG/DOSE, (Ozempic, 1 MG/DOSE,) 2 MG/1.5ML solution pen-injector, Inject  under the skin into the appropriate area as directed 1 (One) Time Per Week., Disp: , Rfl:   •  vitamin D (ERGOCALCIFEROL) 1.25 MG (84623 UT) capsule capsule, Take 50,000 Units by mouth Daily., Disp: , Rfl:   •  acetaminophen (TYLENOL) 325 MG tablet, Take 2 tablets by mouth Every 4 (Four) Hours As Needed for Mild Pain ., Disp: 30 tablet, Rfl: 0  •  fenofibrate (TRICOR) 145 MG tablet, Take 145 mg by mouth Daily., Disp: , Rfl:   •  Ferrex 150 150 MG capsule, Take 150 mg by mouth Daily As Needed., Disp: , Rfl:   •  ibuprofen (ADVIL,MOTRIN) 600 MG tablet, Take 1 tablet by mouth Every 6 (Six) Hours As Needed for Mild Pain ., Disp: 30 tablet, Rfl: 0  •  Jardiance 25 MG tablet, Take 1 tablet by mouth Daily., Disp: , Rfl:   •  Ozempic, 1 MG/DOSE, 2 MG/1.5ML solution pen-injector, Inject 1 mg under the skin into the appropriate area as directed 1 (One) Time Per Week., Disp: , Rfl:   •  Semaglutide,0.25 or 0.5MG/DOS, (Ozempic, 0.25 or 0.5 MG/DOSE,) 2 MG/1.5ML solution pen-injector, Inject  under the skin into the appropriate area as directed 1 (One) Time Per Week., Disp: , Rfl:   •  traMADol (ULTRAM) 50 MG tablet, Take 1 tablet by mouth Every 8 (Eight) Hours As Needed for Moderate Pain ., Disp: 6 tablet, Rfl: 0    ALLERGIES:    Allergies   Allergen Reactions   • Mushroom Other (See Comments)     PT REPORTS THROAT TINGLES AND MOUTH GET NUMB         REVIEW OF SYSTEMS:    A comprehensive 14 point review of systems was performed.  Significant findings as mentioned above.  All other systems reviewed and are negative.        Physical Exam   Vital Signs:   Vitals:    11/07/22 0820   BP: 143/88   Pulse: 84   Resp: 18   Temp: 97.1 °F (36.2 °C)   SpO2: 99%    Patient's Body mass index is 32.13 kg/m².     ECOG score: 0    General: Well developed, well nourished, alert and oriented x 3, in no acute distress.   Head: ATNC   Eyes: PERRL, No evidence of conjunctivitis.   Nose: No nasal discharge.   Mouth: Oral mucosal membranes moist. No oral ulceration or hemorrhages.   Neck: Neck supple. No thyromegaly. No JVD.   Lungs: Clear in all fields to A&P without rales, rhonchi or wheezing.   Heart: S1, S2. Regular rate and rhythm. No murmurs, rubs, or gallops.   Abdomen: Soft. Bowel sounds are normoactive. Nontender with palpation. No Hepatosplenomegaly can be appreciated.   Extremities: No clubbing, cyanosis or edema.   Integumentary: Warm, dry, intact.   Neurologic: Grossly non-focal exam.    Pain Score:  Pain Score    11/07/22 0820   PainSc: 0-No pain       PHQ-Score Total:  PHQ-9 Total Score:         IMAGING:        RECENT LABS:  Lab Results   Component Value Date    WBC 9.35 08/22/2022    HGB 12.8 08/22/2022    HCT 41.6 08/22/2022    MCV 83.5 08/22/2022    RDW 13.3 08/22/2022     08/22/2022    NEUTRORELPCT 70.9 08/22/2022    LYMPHORELPCT 20.7 08/22/2022    MONORELPCT 5.9 08/22/2022    EOSRELPCT 1.6 08/22/2022    BASORELPCT 0.4 08/22/2022    NEUTROABS 6.62 08/22/2022    LYMPHSABS 1.94 08/22/2022       Lab Results   Component Value Date     05/03/2021    K 4.3 05/03/2021    CO2 23.6 05/03/2021     05/03/2021    BUN 11 05/03/2021    CREATININE 0.88 05/03/2021    EGFRIFNONA 70 05/03/2021    EGFRIFAFRI 125 01/29/2015    GLUCOSE 81 05/03/2021    CALCIUM 9.7 05/03/2021    ALKPHOS 34 (L) 05/03/2021    AST 24 05/03/2021    ALT 23 05/03/2021    BILITOT 0.3 05/03/2021    ALBUMIN 4.0 09/27/2021    PROTEINTOT 8.1 05/03/2021       Lab Results   Component Value Date     05/03/2021       Lab Results   Component Value Date    FERRITIN 146.80 08/22/2022    IRON 60 08/22/2022    TIBC 395 08/22/2022    LABIRON 15 (L) 08/22/2022    EBNLBRRM29 921 08/22/2022    FOLATE >20.00 08/22/2022    HAPTOGLOBIN 120 05/03/2021    RETICCTPCT  "0.90 05/03/2021    RETIC 0.0441 05/03/2021        Work up 05/03/2021      Sed Rate   0 - 20 mm/hr 8      C-Reactive Protein   0.00 - 0.50 mg/dL 0.35      Copper   80 - 158 ug/dL 139      Zinc   44 - 115 ug/dL 84      Tissue Transglutaminase IgA   0 - 3 U/mL <2          ASSESSMENT & PLAN:  Faby Avelar is a very pleasant 47 y.o. female with    1.Microcytic Anemia  2. AURY  3. B12 deficiency  4. Folate deficiency  - Previous available CBCs were reviewed and noted microyctic anemia, Hg 11.5, in December 2020 and Hg 10.3 in April 2021. Her WBC and platelets were normal. There are no other CBCs available to review for comparison of trend. She reports that she has always struggled with AURY and has been on and off oral iron multiple times in the past. She reports today that she has been on oral iron for the last two years and has not been fully compliant due to abdominal pain, nausea and constipation caused by the supplement.   - She reports that her periods are normal. However, she occasionally struggles with menorrhagia. She follows with GYN and has recommended annual exams.  - Denies blood loss from any source, no melena, rectal bleeding or hematuria. She reports having EGD/colonoscopy in 2006 with Dr. Mak which was unremarkable.   - She denies history of blood transfusions.  - CBC from initial consultation showed Hg 10.7. Iron panel and ferritin were low and most consistent with iron deficiency anemia and anemia of chronic disease/inflammation. Therefore, discontinued oral iron and replaced with IV Feraheme in June 2021.   - Also sent additional labs to further evaluate including PBS (summarized above) which showed microcytic, hypochromic anemia with anisocytosis including \"pencil\" cells, compatible with the provided history of AURY, normal WBC and differential, mature WBC morphology with no granulocytic dysplasia or blasts identified, adequate platelets. B12 and folate were low and she was started on B12 SL 2500 " mcg daily and folic acid 1 mg daily. No evidence of hemolysis as Retic, LDH and Haptoglobin were normal. ESR and CRP normal.  Copper, zinc and tissue transglutaminase were normal. To further evaluate anemia obtained serum LINDA which showed no M-spike and serum free light chains revealed normal kappa/lambda ratio.  - Cologuard testing per PCP in January 2022 was negative per patient.   - Repeat CBC from today is showing Hg ~12.2, improved. Iron and ferritin are replete.   - Will follow up in 4 months with repeat labs. Will replace with IV Feraheme as needed. In the interim, she is aware to call if she were to develop increasing weakness/fatigue, shortness of breath on exertion, chest pain or dizziness and we will be happy to check labs sooner.       ACO / TY/Other  Quality measures  -  Faby Avelar received 2022 flu vaccine.  -  Faby Avelar reports a pain score of 0.    -  Current outpatient and discharge medications have been reconciled for the patient.  Reviewed by: JOHNY Ivan              A total of 30 minutes were spent coordinating this patient’s care in clinic today; more than 50% of this time was face-to-face with the patient, reviewing her interim medical history and counseling on the current treatment and followup plan. All questions were answered to her satisfaction.          Electronically Signed by: JOHNY Restrepo APRN November 7, 2022 08:27 EST       CC:   No ref. provider found  Dorina Ball MD

## 2023-01-19 RX ORDER — FOLIC ACID 1 MG/1
1 TABLET ORAL DAILY
Qty: 30 TABLET | Refills: 5 | Status: SHIPPED | OUTPATIENT
Start: 2023-01-19

## 2023-03-15 ENCOUNTER — LAB (OUTPATIENT)
Dept: ONCOLOGY | Facility: CLINIC | Age: 48
End: 2023-03-15
Payer: COMMERCIAL

## 2023-03-15 ENCOUNTER — OFFICE VISIT (OUTPATIENT)
Dept: ONCOLOGY | Facility: CLINIC | Age: 48
End: 2023-03-15
Payer: COMMERCIAL

## 2023-03-15 VITALS
DIASTOLIC BLOOD PRESSURE: 81 MMHG | RESPIRATION RATE: 18 BRPM | TEMPERATURE: 97.7 F | BODY MASS INDEX: 32.27 KG/M2 | WEIGHT: 189 LBS | HEIGHT: 64 IN | HEART RATE: 80 BPM | OXYGEN SATURATION: 98 % | SYSTOLIC BLOOD PRESSURE: 126 MMHG

## 2023-03-15 DIAGNOSIS — K90.9 INTESTINAL MALABSORPTION, UNSPECIFIED TYPE: ICD-10-CM

## 2023-03-15 DIAGNOSIS — E53.8 FOLATE DEFICIENCY: ICD-10-CM

## 2023-03-15 DIAGNOSIS — D50.9 IRON DEFICIENCY ANEMIA, UNSPECIFIED IRON DEFICIENCY ANEMIA TYPE: ICD-10-CM

## 2023-03-15 DIAGNOSIS — E53.8 B12 DEFICIENCY: ICD-10-CM

## 2023-03-15 DIAGNOSIS — K90.9 INTESTINAL MALABSORPTION, UNSPECIFIED TYPE: Primary | ICD-10-CM

## 2023-03-15 LAB
BASOPHILS # BLD AUTO: 0.04 10*3/MM3 (ref 0–0.2)
BASOPHILS NFR BLD AUTO: 0.6 % (ref 0–1.5)
DEPRECATED RDW RBC AUTO: 39.9 FL (ref 37–54)
EOSINOPHIL # BLD AUTO: 0.13 10*3/MM3 (ref 0–0.4)
EOSINOPHIL NFR BLD AUTO: 1.9 % (ref 0.3–6.2)
ERYTHROCYTE [DISTWIDTH] IN BLOOD BY AUTOMATED COUNT: 13.7 % (ref 12.3–15.4)
FERRITIN SERPL-MCNC: 54.53 NG/ML (ref 13–150)
HCT VFR BLD AUTO: 39.1 % (ref 34–46.6)
HGB BLD-MCNC: 12 G/DL (ref 12–15.9)
IMM GRANULOCYTES # BLD AUTO: 0.02 10*3/MM3 (ref 0–0.05)
IMM GRANULOCYTES NFR BLD AUTO: 0.3 % (ref 0–0.5)
IRON 24H UR-MRATE: 49 MCG/DL (ref 37–145)
IRON SATN MFR SERPL: 12 % (ref 20–50)
LYMPHOCYTES # BLD AUTO: 2.4 10*3/MM3 (ref 0.7–3.1)
LYMPHOCYTES NFR BLD AUTO: 34.5 % (ref 19.6–45.3)
MCH RBC QN AUTO: 24.7 PG (ref 26.6–33)
MCHC RBC AUTO-ENTMCNC: 30.7 G/DL (ref 31.5–35.7)
MCV RBC AUTO: 80.6 FL (ref 79–97)
MONOCYTES # BLD AUTO: 0.49 10*3/MM3 (ref 0.1–0.9)
MONOCYTES NFR BLD AUTO: 7.1 % (ref 5–12)
NEUTROPHILS NFR BLD AUTO: 3.87 10*3/MM3 (ref 1.7–7)
NEUTROPHILS NFR BLD AUTO: 55.6 % (ref 42.7–76)
NRBC BLD AUTO-RTO: 0 /100 WBC (ref 0–0.2)
PLATELET # BLD AUTO: 299 10*3/MM3 (ref 140–450)
PMV BLD AUTO: 9.8 FL (ref 6–12)
RBC # BLD AUTO: 4.85 10*6/MM3 (ref 3.77–5.28)
TIBC SERPL-MCNC: 399 MCG/DL (ref 298–536)
TRANSFERRIN SERPL-MCNC: 268 MG/DL (ref 200–360)
WBC NRBC COR # BLD: 6.95 10*3/MM3 (ref 3.4–10.8)

## 2023-03-15 PROCEDURE — 99214 OFFICE O/P EST MOD 30 MIN: CPT | Performed by: NURSE PRACTITIONER

## 2023-03-15 PROCEDURE — 85025 COMPLETE CBC W/AUTO DIFF WBC: CPT | Performed by: NURSE PRACTITIONER

## 2023-03-15 PROCEDURE — 82728 ASSAY OF FERRITIN: CPT | Performed by: NURSE PRACTITIONER

## 2023-03-15 PROCEDURE — 82746 ASSAY OF FOLIC ACID SERUM: CPT | Performed by: NURSE PRACTITIONER

## 2023-03-15 PROCEDURE — 84466 ASSAY OF TRANSFERRIN: CPT | Performed by: NURSE PRACTITIONER

## 2023-03-15 PROCEDURE — 82607 VITAMIN B-12: CPT | Performed by: NURSE PRACTITIONER

## 2023-03-15 PROCEDURE — 83540 ASSAY OF IRON: CPT | Performed by: NURSE PRACTITIONER

## 2023-03-15 RX ORDER — SEMAGLUTIDE 2.68 MG/ML
INJECTION, SOLUTION SUBCUTANEOUS
COMMUNITY
Start: 2023-02-25

## 2023-03-15 NOTE — PROGRESS NOTES
DATE:  3/15/2023    DIAGNOSIS: AURY    CHIEF COMPLAINT:  Fatigue      TREATMENT HISTORY:  Feraheme Day 1 06/04/2021  Day 8 06/10/2021  Feraheme Day 1 04/04/2022  Day 8 04/11/2022    B12 2500 mcg SL daily  Folic Acid 1 mg daily    HISTORY OF PRESENT ILLNESS:   Faby Avelar is a very pleasant 47 y.o. female who is being seen today at the request of Dorina Ball MD for evaluation and treatment of iron deficiency anemia. Ms. Avelar reports following with her PCP every 6 months with routine lab testing. Previous available CBCs were reviewed and noted microyctic anemia, Hg 11.5, in December 2020 and Hg 10.3 in April 2021. Her WBC and platelets were normal. There are no other CBCs available to review for comparison of trend. She reports that she has always struggled with AURY and has been on and off oral iron multiple times in the past. She reports today that she has been on oral iron for the last two years and has not been fully compliant due to abdominal pain, nausea and constipation caused by the supplement. She reports fatigue, shortness of breath on exertion and occasional dizziness. She reports that her periods are normal. However, she occasionally struggles with menorrhagia. She follows with GYN and has recommended annual exams. She otherwise, denies blood loss from any source, no melena, rectal bleeding or hematuria. She reports having EGD/colonoscopy in 2006 with Dr. Mak which was unremarkable. She denies history of blood transfusions. She denies any other specific complaints today.    Interval History:  Ms. Avelar presents today for follow up of AURY. She was last replaced with IV Feraheme in April 2022. She is taking SL B12 and folic acid daily and tolerating this well. She reports worsening fatigue over the last several weeks. She denies shortness of breath on exertion, chest pain or dizziness. She reports normal menstrual periods. Otherwise, she denies blood loss from any other source. She denies  any other specific complaints today.     The following portions of the patient's history were reviewed and updated as appropriate: allergies, current medications, past family history, past medical history, past social history, past surgical history and problem list.    PAST MEDICAL HISTORY:  Past Medical History:   Diagnosis Date   • Diabetes mellitus (HCC)    • Elevated cholesterol    • Heartburn    • Hypertension    • Low iron    • Thyroid activity decreased        PAST SURGICAL HISTORY:  Past Surgical History:   Procedure Laterality Date   • BREAST BIOPSY Right 10/29/2020    Procedure: BREAST BIOPSY WITH NEEDLE LOCALIZATION;  Surgeon: Dao Cano MD;  Location: [facility];  Service: General;  Laterality: Right;   • GALLBLADDER SURGERY     • WRIST SURGERY         SOCIAL HISTORY:  Social History     Socioeconomic History   • Marital status:    Tobacco Use   • Smoking status: Never   • Smokeless tobacco: Never   Vaping Use   • Vaping Use: Never used   Substance and Sexual Activity   • Alcohol use: Never   • Drug use: Never   • Sexual activity: Defer           FAMILY HISTORY:  Family History   Problem Relation Age of Onset   • COPD Mother    • Hypertension Mother    • Skin cancer Mother    • Heart disease Father    • Diabetes Father    • Hyperlipidemia Father          MEDICATIONS:  The current medication list was reviewed in the EMR    Current Outpatient Medications:   •  acetaminophen (TYLENOL) 325 MG tablet, Take 2 tablets by mouth Every 4 (Four) Hours As Needed for Mild Pain ., Disp: 30 tablet, Rfl: 0  •  cyanocobalamin (VITAMIN B-12) 2500 MCG tablet tablet, Take 1 tablet by mouth Daily., Disp: 30 tablet, Rfl: 5  •  fenofibrate (TRICOR) 145 MG tablet, Take 145 mg by mouth Daily., Disp: , Rfl:   •  Ferrex 150 150 MG capsule, Take 150 mg by mouth Daily As Needed., Disp: , Rfl:   •  folic acid (FOLVITE) 1 MG tablet, Take 1 tablet by mouth Daily., Disp: 30 tablet, Rfl: 5  •  gabapentin (NEURONTIN) 300  MG capsule, TAKE 1 (ONE) CAPSULE BY MOUTH AT BEDTIME, Disp: , Rfl:   •  ibuprofen (ADVIL,MOTRIN) 600 MG tablet, Take 1 tablet by mouth Every 6 (Six) Hours As Needed for Mild Pain ., Disp: 30 tablet, Rfl: 0  •  irbesartan (AVAPRO) 150 MG tablet, Take 150 mg by mouth Daily., Disp: , Rfl:   •  Jardiance 25 MG tablet, Take 1 tablet by mouth Daily., Disp: , Rfl:   •  levocetirizine (XYZAL) 5 MG tablet, Take 1 tablet by mouth Every Evening., Disp: , Rfl:   •  levothyroxine (SYNTHROID, LEVOTHROID) 75 MCG tablet, Take 125 mcg by mouth Daily., Disp: , Rfl:   •  montelukast (SINGULAIR) 10 MG tablet, Take 1 tablet by mouth Every Night., Disp: , Rfl:   •  Ozempic, 1 MG/DOSE, 2 MG/1.5ML solution pen-injector, Inject 1 mg under the skin into the appropriate area as directed 1 (One) Time Per Week., Disp: , Rfl:   •  Semaglutide, 1 MG/DOSE, (Ozempic, 1 MG/DOSE,) 2 MG/1.5ML solution pen-injector, Inject  under the skin into the appropriate area as directed 1 (One) Time Per Week., Disp: , Rfl:   •  Semaglutide,0.25 or 0.5MG/DOS, (Ozempic, 0.25 or 0.5 MG/DOSE,) 2 MG/1.5ML solution pen-injector, Inject  under the skin into the appropriate area as directed 1 (One) Time Per Week., Disp: , Rfl:   •  traMADol (ULTRAM) 50 MG tablet, Take 1 tablet by mouth Every 8 (Eight) Hours As Needed for Moderate Pain ., Disp: 6 tablet, Rfl: 0  •  vitamin D (ERGOCALCIFEROL) 1.25 MG (70969 UT) capsule capsule, Take 50,000 Units by mouth Daily., Disp: , Rfl:     ALLERGIES:    Allergies   Allergen Reactions   • Mushroom Other (See Comments)     PT REPORTS THROAT TINGLES AND MOUTH GET NUMB         REVIEW OF SYSTEMS:    A comprehensive 14 point review of systems was performed.  Significant findings as mentioned above.  All other systems reviewed and are negative.        Physical Exam   Vital Signs:   Vitals:    03/15/23 1406   BP: 126/81   Pulse: 80   Resp: 18   Temp: 97.7 °F (36.5 °C)   SpO2: 98%    Patient's Body mass index is 32.44 kg/m².     ECOG score: 0    General: Well developed, well nourished, alert and oriented x 3, in no acute distress.   Head: ATNC   Eyes: PERRL, No evidence of conjunctivitis.   Nose: No nasal discharge.   Mouth: Oral mucosal membranes moist. No oral ulceration or hemorrhages.   Neck: Neck supple. No thyromegaly. No JVD.   Lungs: Clear in all fields to A&P without rales, rhonchi or wheezing.   Heart: S1, S2. Regular rate and rhythm. No murmurs, rubs, or gallops.   Abdomen: Soft. Bowel sounds are normoactive. Nontender with palpation. No Hepatosplenomegaly can be appreciated.   Extremities: No clubbing, cyanosis or edema.   Integumentary: Warm, dry, intact.   Neurologic: Grossly non-focal exam.    Pain Score:  Pain Score    03/15/23 1406   PainSc: 0-No pain       PHQ-Score Total:  PHQ-9 Total Score:         IMAGING:        RECENT LABS:  Lab Results   Component Value Date    WBC 7.88 11/07/2022    HGB 12.2 11/07/2022    HCT 38.8 11/07/2022    MCV 81.2 11/07/2022    RDW 13.5 11/07/2022     11/07/2022    NEUTRORELPCT 64.2 11/07/2022    LYMPHORELPCT 26.8 11/07/2022    MONORELPCT 6.1 11/07/2022    EOSRELPCT 2.2 11/07/2022    BASORELPCT 0.4 11/07/2022    NEUTROABS 5.07 11/07/2022    LYMPHSABS 2.11 11/07/2022       Lab Results   Component Value Date     05/03/2021    K 4.3 05/03/2021    CO2 23.6 05/03/2021     05/03/2021    BUN 11 05/03/2021    CREATININE 0.88 05/03/2021    EGFRIFNONA 70 05/03/2021    EGFRIFAFRI 125 01/29/2015    GLUCOSE 81 05/03/2021    CALCIUM 9.7 05/03/2021    ALKPHOS 34 (L) 05/03/2021    AST 24 05/03/2021    ALT 23 05/03/2021    BILITOT 0.3 05/03/2021    ALBUMIN 4.0 09/27/2021    PROTEINTOT 8.1 05/03/2021       Lab Results   Component Value Date     05/03/2021       Lab Results   Component Value Date    FERRITIN 122.00 11/07/2022    IRON 47 11/07/2022    TIBC 380 11/07/2022    LABIRON 12 (L) 11/07/2022    TOMOMBYE69 921 08/22/2022    FOLATE >20.00 08/22/2022    HAPTOGLOBIN 120 05/03/2021    RETICCTPCT  "0.90 05/03/2021    RETIC 0.0441 05/03/2021        Work up 05/03/2021      Sed Rate   0 - 20 mm/hr 8      C-Reactive Protein   0.00 - 0.50 mg/dL 0.35      Copper   80 - 158 ug/dL 139      Zinc   44 - 115 ug/dL 84      Tissue Transglutaminase IgA   0 - 3 U/mL <2          ASSESSMENT & PLAN:  Faby Avelar is a very pleasant 47 y.o. female with    1.Microcytic Anemia  2. AURY  3. B12 deficiency  4. Folate deficiency  - Previous available CBCs were reviewed and noted microyctic anemia, Hg 11.5, in December 2020 and Hg 10.3 in April 2021. Her WBC and platelets were normal. There are no other CBCs available to review for comparison of trend. She reports that she has always struggled with AURY and has been on and off oral iron multiple times in the past. She reports today that she has been on oral iron for the last two years and has not been fully compliant due to abdominal pain, nausea and constipation caused by the supplement.   - She reports that her periods are normal. However, she occasionally struggles with menorrhagia. She follows with GYN and has recommended annual exams.  - Denies blood loss from any source, no melena, rectal bleeding or hematuria. She reports having EGD/colonoscopy in 2006 with Dr. Mak which was unremarkable.   - She denies history of blood transfusions.  - CBC from initial consultation showed Hg 10.7. Iron panel and ferritin were low and most consistent with iron deficiency anemia and anemia of chronic disease/inflammation. Therefore, discontinued oral iron and replaced with IV Feraheme in June 2021.   - Also sent additional labs to further evaluate including PBS (summarized above) which showed microcytic, hypochromic anemia with anisocytosis including \"pencil\" cells, compatible with the provided history of AURY, normal WBC and differential, mature WBC morphology with no granulocytic dysplasia or blasts identified, adequate platelets. B12 and folate were low and she was started on B12 SL 2500 " mcg daily and folic acid 1 mg daily. No evidence of hemolysis as Retic, LDH and Haptoglobin were normal. ESR and CRP normal.  Copper, zinc and tissue transglutaminase were normal. To further evaluate anemia obtained serum LINDA which showed no M-spike and serum free light chains revealed normal kappa/lambda ratio.  - Cologuard testing per PCP in January 2022 was negative per patient.   - Repeat CBC from today is showing Hg ~12.0, stable. Iron is normal with low Iron saturation and normal ferritin.   - Will repeat labs in 2 months and replace with IV Feraheme as needed. Will follow up in 4 months with repeat labs.         ACO / TY/Other  Quality measures  -  Faby Avelar received 2022 flu vaccine.  -  Faby Avelar reports a pain score of 0.    -  Current outpatient and discharge medications have been reconciled for the patient.  Reviewed by: JOHNY Ivan            A total of 30 minutes were spent coordinating this patient’s care in clinic today; more than 50% of this time was face-to-face with the patient, reviewing her interim medical history and counseling on the current treatment and followup plan. All questions were answered to her satisfaction.          Electronically Signed by: JOHNY Restrepo APRN March 15, 2023 14:08 EDT       CC:   No ref. provider found  Dorina Ball MD

## 2023-03-15 NOTE — PROGRESS NOTES
Venipuncture Blood Specimen Collection  Venipuncture performed in right arm by Cali Montana MA with good hemostasis. Patient tolerated the procedure well without complications.   03/15/23   Cali Montana MA

## 2023-03-16 LAB
FOLATE SERPL-MCNC: >20 NG/ML (ref 4.78–24.2)
VIT B12 BLD-MCNC: 802 PG/ML (ref 211–946)

## 2023-05-15 ENCOUNTER — LAB (OUTPATIENT)
Dept: ONCOLOGY | Facility: CLINIC | Age: 48
End: 2023-05-15
Payer: COMMERCIAL

## 2023-05-15 VITALS
DIASTOLIC BLOOD PRESSURE: 94 MMHG | SYSTOLIC BLOOD PRESSURE: 138 MMHG | OXYGEN SATURATION: 98 % | HEART RATE: 75 BPM | RESPIRATION RATE: 18 BRPM | TEMPERATURE: 97.3 F

## 2023-05-15 DIAGNOSIS — K90.9 INTESTINAL MALABSORPTION, UNSPECIFIED TYPE: Primary | ICD-10-CM

## 2023-05-15 DIAGNOSIS — D50.9 IRON DEFICIENCY ANEMIA, UNSPECIFIED IRON DEFICIENCY ANEMIA TYPE: ICD-10-CM

## 2023-05-15 DIAGNOSIS — K90.9 INTESTINAL MALABSORPTION, UNSPECIFIED TYPE: ICD-10-CM

## 2023-05-15 LAB
BASOPHILS # BLD AUTO: 0.03 10*3/MM3 (ref 0–0.2)
BASOPHILS NFR BLD AUTO: 0.4 % (ref 0–1.5)
DEPRECATED RDW RBC AUTO: 40.8 FL (ref 37–54)
EOSINOPHIL # BLD AUTO: 0.16 10*3/MM3 (ref 0–0.4)
EOSINOPHIL NFR BLD AUTO: 2.3 % (ref 0.3–6.2)
ERYTHROCYTE [DISTWIDTH] IN BLOOD BY AUTOMATED COUNT: 14.1 % (ref 12.3–15.4)
FERRITIN SERPL-MCNC: 37.08 NG/ML (ref 13–150)
HCT VFR BLD AUTO: 38.2 % (ref 34–46.6)
HGB BLD-MCNC: 11.4 G/DL (ref 12–15.9)
IMM GRANULOCYTES # BLD AUTO: 0.01 10*3/MM3 (ref 0–0.05)
IMM GRANULOCYTES NFR BLD AUTO: 0.1 % (ref 0–0.5)
IRON 24H UR-MRATE: 42 MCG/DL (ref 37–145)
IRON SATN MFR SERPL: 10 % (ref 20–50)
LYMPHOCYTES # BLD AUTO: 1.89 10*3/MM3 (ref 0.7–3.1)
LYMPHOCYTES NFR BLD AUTO: 27.4 % (ref 19.6–45.3)
MCH RBC QN AUTO: 23.8 PG (ref 26.6–33)
MCHC RBC AUTO-ENTMCNC: 29.8 G/DL (ref 31.5–35.7)
MCV RBC AUTO: 79.6 FL (ref 79–97)
MONOCYTES # BLD AUTO: 0.43 10*3/MM3 (ref 0.1–0.9)
MONOCYTES NFR BLD AUTO: 6.2 % (ref 5–12)
NEUTROPHILS NFR BLD AUTO: 4.37 10*3/MM3 (ref 1.7–7)
NEUTROPHILS NFR BLD AUTO: 63.6 % (ref 42.7–76)
NRBC BLD AUTO-RTO: 0 /100 WBC (ref 0–0.2)
PLATELET # BLD AUTO: 316 10*3/MM3 (ref 140–450)
PMV BLD AUTO: 9.7 FL (ref 6–12)
RBC # BLD AUTO: 4.8 10*6/MM3 (ref 3.77–5.28)
TIBC SERPL-MCNC: 420 MCG/DL (ref 298–536)
TRANSFERRIN SERPL-MCNC: 282 MG/DL (ref 200–360)
WBC NRBC COR # BLD: 6.89 10*3/MM3 (ref 3.4–10.8)

## 2023-05-15 PROCEDURE — 85025 COMPLETE CBC W/AUTO DIFF WBC: CPT | Performed by: NURSE PRACTITIONER

## 2023-05-15 PROCEDURE — 82728 ASSAY OF FERRITIN: CPT | Performed by: NURSE PRACTITIONER

## 2023-05-15 PROCEDURE — 84466 ASSAY OF TRANSFERRIN: CPT | Performed by: NURSE PRACTITIONER

## 2023-05-15 PROCEDURE — 83540 ASSAY OF IRON: CPT | Performed by: NURSE PRACTITIONER

## 2023-05-15 RX ORDER — SODIUM CHLORIDE 9 MG/ML
250 INJECTION, SOLUTION INTRAVENOUS ONCE
OUTPATIENT
Start: 2023-05-15

## 2023-05-15 NOTE — PROGRESS NOTES
Venipuncture Blood Specimen Collection  Venipuncture performed in right arm by Roxanne Umaña MA with good hemostasis. Patient tolerated the procedure well without complications.   05/15/23   Roxanne Umaña MA

## 2023-06-01 ENCOUNTER — INFUSION (OUTPATIENT)
Dept: ONCOLOGY | Facility: HOSPITAL | Age: 48
End: 2023-06-01

## 2023-06-01 VITALS
BODY MASS INDEX: 31.39 KG/M2 | HEART RATE: 82 BPM | WEIGHT: 182.9 LBS | TEMPERATURE: 97.7 F | DIASTOLIC BLOOD PRESSURE: 88 MMHG | RESPIRATION RATE: 18 BRPM | OXYGEN SATURATION: 99 % | SYSTOLIC BLOOD PRESSURE: 146 MMHG

## 2023-06-01 DIAGNOSIS — K90.9 INTESTINAL MALABSORPTION, UNSPECIFIED TYPE: Primary | ICD-10-CM

## 2023-06-01 DIAGNOSIS — D50.9 IRON DEFICIENCY ANEMIA, UNSPECIFIED IRON DEFICIENCY ANEMIA TYPE: ICD-10-CM

## 2023-06-01 PROCEDURE — 96365 THER/PROPH/DIAG IV INF INIT: CPT

## 2023-06-01 PROCEDURE — 96374 THER/PROPH/DIAG INJ IV PUSH: CPT

## 2023-06-01 PROCEDURE — 25010000002 FERUMOXYTOL 510 MG/17ML SOLUTION: Performed by: NURSE PRACTITIONER

## 2023-06-01 RX ORDER — SODIUM CHLORIDE 9 MG/ML
250 INJECTION, SOLUTION INTRAVENOUS ONCE
Status: COMPLETED | OUTPATIENT
Start: 2023-06-01 | End: 2023-06-01

## 2023-06-01 RX ADMIN — SODIUM CHLORIDE 250 ML: 9 INJECTION, SOLUTION INTRAVENOUS at 14:09

## 2023-06-01 RX ADMIN — FERUMOXYTOL 510 MG: 510 INJECTION INTRAVENOUS at 14:09

## 2023-06-08 ENCOUNTER — INFUSION (OUTPATIENT)
Dept: ONCOLOGY | Facility: HOSPITAL | Age: 48
End: 2023-06-08
Payer: COMMERCIAL

## 2023-06-08 VITALS
HEART RATE: 73 BPM | OXYGEN SATURATION: 99 % | RESPIRATION RATE: 18 BRPM | SYSTOLIC BLOOD PRESSURE: 118 MMHG | TEMPERATURE: 97.7 F | DIASTOLIC BLOOD PRESSURE: 76 MMHG

## 2023-06-08 DIAGNOSIS — K90.9 INTESTINAL MALABSORPTION, UNSPECIFIED TYPE: Primary | ICD-10-CM

## 2023-06-08 DIAGNOSIS — D50.9 IRON DEFICIENCY ANEMIA, UNSPECIFIED IRON DEFICIENCY ANEMIA TYPE: ICD-10-CM

## 2023-06-08 PROCEDURE — 25010000002 FERUMOXYTOL 510 MG/17ML SOLUTION: Performed by: NURSE PRACTITIONER

## 2023-06-08 PROCEDURE — 96365 THER/PROPH/DIAG IV INF INIT: CPT

## 2023-06-08 PROCEDURE — 96374 THER/PROPH/DIAG INJ IV PUSH: CPT

## 2023-06-08 RX ORDER — SODIUM CHLORIDE 9 MG/ML
250 INJECTION, SOLUTION INTRAVENOUS ONCE
Status: COMPLETED | OUTPATIENT
Start: 2023-06-08 | End: 2023-06-08

## 2023-06-08 RX ADMIN — FERUMOXYTOL 510 MG: 510 INJECTION INTRAVENOUS at 14:57

## 2023-06-08 RX ADMIN — SODIUM CHLORIDE 250 ML: 9 INJECTION, SOLUTION INTRAVENOUS at 14:56

## 2023-07-20 PROCEDURE — 83540 ASSAY OF IRON: CPT | Performed by: NURSE PRACTITIONER

## 2023-07-20 PROCEDURE — 85025 COMPLETE CBC W/AUTO DIFF WBC: CPT | Performed by: NURSE PRACTITIONER

## 2023-07-20 PROCEDURE — 82728 ASSAY OF FERRITIN: CPT | Performed by: NURSE PRACTITIONER

## 2023-07-20 PROCEDURE — 84466 ASSAY OF TRANSFERRIN: CPT | Performed by: NURSE PRACTITIONER

## 2023-09-20 ENCOUNTER — LAB (OUTPATIENT)
Dept: ONCOLOGY | Facility: CLINIC | Age: 48
End: 2023-09-20
Payer: COMMERCIAL

## 2023-09-20 VITALS
DIASTOLIC BLOOD PRESSURE: 91 MMHG | RESPIRATION RATE: 18 BRPM | OXYGEN SATURATION: 97 % | HEART RATE: 78 BPM | SYSTOLIC BLOOD PRESSURE: 155 MMHG | TEMPERATURE: 97.3 F

## 2023-09-20 DIAGNOSIS — K90.9 INTESTINAL MALABSORPTION, UNSPECIFIED TYPE: ICD-10-CM

## 2023-09-20 DIAGNOSIS — D50.9 IRON DEFICIENCY ANEMIA, UNSPECIFIED IRON DEFICIENCY ANEMIA TYPE: ICD-10-CM

## 2023-09-20 LAB
BASOPHILS # BLD AUTO: 0.03 10*3/MM3 (ref 0–0.2)
BASOPHILS NFR BLD AUTO: 0.4 % (ref 0–1.5)
DEPRECATED RDW RBC AUTO: 42.4 FL (ref 37–54)
EOSINOPHIL # BLD AUTO: 0.11 10*3/MM3 (ref 0–0.4)
EOSINOPHIL NFR BLD AUTO: 1.4 % (ref 0.3–6.2)
ERYTHROCYTE [DISTWIDTH] IN BLOOD BY AUTOMATED COUNT: 14.2 % (ref 12.3–15.4)
FERRITIN SERPL-MCNC: 205.4 NG/ML (ref 13–150)
HCT VFR BLD AUTO: 40.1 % (ref 34–46.6)
HGB BLD-MCNC: 12.2 G/DL (ref 12–15.9)
IMM GRANULOCYTES # BLD AUTO: 0.03 10*3/MM3 (ref 0–0.05)
IMM GRANULOCYTES NFR BLD AUTO: 0.4 % (ref 0–0.5)
IRON 24H UR-MRATE: 77 MCG/DL (ref 37–145)
IRON SATN MFR SERPL: 21 % (ref 20–50)
LYMPHOCYTES # BLD AUTO: 2.36 10*3/MM3 (ref 0.7–3.1)
LYMPHOCYTES NFR BLD AUTO: 29.1 % (ref 19.6–45.3)
MCH RBC QN AUTO: 25.1 PG (ref 26.6–33)
MCHC RBC AUTO-ENTMCNC: 30.4 G/DL (ref 31.5–35.7)
MCV RBC AUTO: 82.5 FL (ref 79–97)
MONOCYTES # BLD AUTO: 0.5 10*3/MM3 (ref 0.1–0.9)
MONOCYTES NFR BLD AUTO: 6.2 % (ref 5–12)
NEUTROPHILS NFR BLD AUTO: 5.09 10*3/MM3 (ref 1.7–7)
NEUTROPHILS NFR BLD AUTO: 62.5 % (ref 42.7–76)
NRBC BLD AUTO-RTO: 0 /100 WBC (ref 0–0.2)
PLATELET # BLD AUTO: 269 10*3/MM3 (ref 140–450)
PMV BLD AUTO: 9.7 FL (ref 6–12)
RBC # BLD AUTO: 4.86 10*6/MM3 (ref 3.77–5.28)
TIBC SERPL-MCNC: 359 MCG/DL (ref 298–536)
TRANSFERRIN SERPL-MCNC: 241 MG/DL (ref 200–360)
WBC NRBC COR # BLD: 8.12 10*3/MM3 (ref 3.4–10.8)

## 2023-09-20 PROCEDURE — 85025 COMPLETE CBC W/AUTO DIFF WBC: CPT | Performed by: NURSE PRACTITIONER

## 2023-09-20 PROCEDURE — 82728 ASSAY OF FERRITIN: CPT | Performed by: NURSE PRACTITIONER

## 2023-09-20 PROCEDURE — 84466 ASSAY OF TRANSFERRIN: CPT | Performed by: NURSE PRACTITIONER

## 2023-09-20 PROCEDURE — 83540 ASSAY OF IRON: CPT | Performed by: NURSE PRACTITIONER

## 2023-09-20 NOTE — PROGRESS NOTES
Venipuncture Blood Specimen Collection  Venipuncture performed in right arm by Roxanne Umaña MA with good hemostasis. Patient tolerated the procedure well without complications.   09/20/23   Roxanne Umaña MA

## 2023-11-20 ENCOUNTER — OFFICE VISIT (OUTPATIENT)
Dept: ONCOLOGY | Facility: CLINIC | Age: 48
End: 2023-11-20
Payer: COMMERCIAL

## 2023-11-20 ENCOUNTER — LAB (OUTPATIENT)
Dept: ONCOLOGY | Facility: CLINIC | Age: 48
End: 2023-11-20
Payer: COMMERCIAL

## 2023-11-20 VITALS
OXYGEN SATURATION: 98 % | WEIGHT: 187.2 LBS | TEMPERATURE: 97.1 F | DIASTOLIC BLOOD PRESSURE: 88 MMHG | BODY MASS INDEX: 32.13 KG/M2 | SYSTOLIC BLOOD PRESSURE: 129 MMHG | HEART RATE: 76 BPM | RESPIRATION RATE: 18 BRPM

## 2023-11-20 DIAGNOSIS — K90.9 INTESTINAL MALABSORPTION, UNSPECIFIED TYPE: ICD-10-CM

## 2023-11-20 DIAGNOSIS — E53.8 B12 DEFICIENCY: Primary | ICD-10-CM

## 2023-11-20 DIAGNOSIS — E53.8 FOLATE DEFICIENCY: ICD-10-CM

## 2023-11-20 DIAGNOSIS — D50.9 IRON DEFICIENCY ANEMIA, UNSPECIFIED IRON DEFICIENCY ANEMIA TYPE: ICD-10-CM

## 2023-11-20 LAB
BASOPHILS # BLD AUTO: 0.02 10*3/MM3 (ref 0–0.2)
BASOPHILS NFR BLD AUTO: 0.3 % (ref 0–1.5)
DEPRECATED RDW RBC AUTO: 39.8 FL (ref 37–54)
EOSINOPHIL # BLD AUTO: 0.1 10*3/MM3 (ref 0–0.4)
EOSINOPHIL NFR BLD AUTO: 1.3 % (ref 0.3–6.2)
ERYTHROCYTE [DISTWIDTH] IN BLOOD BY AUTOMATED COUNT: 13.5 % (ref 12.3–15.4)
FERRITIN SERPL-MCNC: 143.3 NG/ML (ref 13–150)
HCT VFR BLD AUTO: 40.7 % (ref 34–46.6)
HGB BLD-MCNC: 12.7 G/DL (ref 12–15.9)
IMM GRANULOCYTES # BLD AUTO: 0.02 10*3/MM3 (ref 0–0.05)
IMM GRANULOCYTES NFR BLD AUTO: 0.3 % (ref 0–0.5)
IRON 24H UR-MRATE: 62 MCG/DL (ref 37–145)
IRON SATN MFR SERPL: 16 % (ref 20–50)
LYMPHOCYTES # BLD AUTO: 1.9 10*3/MM3 (ref 0.7–3.1)
LYMPHOCYTES NFR BLD AUTO: 24.1 % (ref 19.6–45.3)
MCH RBC QN AUTO: 25.5 PG (ref 26.6–33)
MCHC RBC AUTO-ENTMCNC: 31.2 G/DL (ref 31.5–35.7)
MCV RBC AUTO: 81.7 FL (ref 79–97)
MONOCYTES # BLD AUTO: 0.61 10*3/MM3 (ref 0.1–0.9)
MONOCYTES NFR BLD AUTO: 7.7 % (ref 5–12)
NEUTROPHILS NFR BLD AUTO: 5.25 10*3/MM3 (ref 1.7–7)
NEUTROPHILS NFR BLD AUTO: 66.3 % (ref 42.7–76)
NRBC BLD AUTO-RTO: 0 /100 WBC (ref 0–0.2)
PLATELET # BLD AUTO: 282 10*3/MM3 (ref 140–450)
PMV BLD AUTO: 9.9 FL (ref 6–12)
RBC # BLD AUTO: 4.98 10*6/MM3 (ref 3.77–5.28)
TIBC SERPL-MCNC: 393 MCG/DL (ref 298–536)
TRANSFERRIN SERPL-MCNC: 264 MG/DL (ref 200–360)
WBC NRBC COR # BLD AUTO: 7.9 10*3/MM3 (ref 3.4–10.8)

## 2023-11-20 PROCEDURE — 83540 ASSAY OF IRON: CPT | Performed by: NURSE PRACTITIONER

## 2023-11-20 PROCEDURE — 99214 OFFICE O/P EST MOD 30 MIN: CPT | Performed by: NURSE PRACTITIONER

## 2023-11-20 PROCEDURE — 85025 COMPLETE CBC W/AUTO DIFF WBC: CPT | Performed by: NURSE PRACTITIONER

## 2023-11-20 PROCEDURE — 82728 ASSAY OF FERRITIN: CPT | Performed by: NURSE PRACTITIONER

## 2023-11-20 PROCEDURE — 84466 ASSAY OF TRANSFERRIN: CPT | Performed by: NURSE PRACTITIONER

## 2023-11-20 RX ORDER — FOLIC ACID 1 MG/1
1 TABLET ORAL DAILY
Qty: 30 TABLET | Refills: 5 | Status: SHIPPED | OUTPATIENT
Start: 2023-11-20

## 2023-11-20 NOTE — PROGRESS NOTES
DATE:  11/20/2023    DIAGNOSIS: AURY    CHIEF COMPLAINT:  Follow Up I      TREATMENT HISTORY:  Feraheme Day 1 06/04/2021  Day 8 06/10/2021  Feraheme Day 1 04/04/2022  Day 8 04/11/2022  Feraheme Day 1 06/01/2023  Day 8 06/08/2023    B12 2500 mcg SL daily- Discontinued  Folic Acid 1 mg daily    HISTORY OF PRESENT ILLNESS:   Faby Avelar is a very pleasant 48 y.o. female who is being seen today at the request of Dorina Ball MD for evaluation and treatment of iron deficiency anemia. Ms. Avelar reports following with her PCP every 6 months with routine lab testing. Previous available CBCs were reviewed and noted microyctic anemia, Hg 11.5, in December 2020 and Hg 10.3 in April 2021. Her WBC and platelets were normal. There are no other CBCs available to review for comparison of trend. She reports that she has always struggled with AURY and has been on and off oral iron multiple times in the past. She reports today that she has been on oral iron for the last two years and has not been fully compliant due to abdominal pain, nausea and constipation caused by the supplement. She reports fatigue, shortness of breath on exertion and occasional dizziness. She reports that her periods are normal. However, she occasionally struggles with menorrhagia. She follows with GYN and has recommended annual exams. She otherwise, denies blood loss from any source, no melena, rectal bleeding or hematuria. She reports having EGD/colonoscopy in 2006 with Dr. Mak which was unremarkable. She denies history of blood transfusions. She denies any other specific complaints today.    Interval History:  Ms. Avelar presents today for follow up of AURY. She was last replaced with IV Feraheme in June 2023. She is taking folic acid daily and tolerating this well. She self-discontinued B12. Overall, she reports that she has been feeling well. She denies fatigue, shortness of breath on exertion, chest pain or dizziness. She reports normal  menstrual periods. Otherwise, she denies blood loss from any other source. She denies any other specific complaints today.     The following portions of the patient's history were reviewed and updated as appropriate: allergies, current medications, past family history, past medical history, past social history, past surgical history and problem list.    PAST MEDICAL HISTORY:  Past Medical History:   Diagnosis Date    Diabetes mellitus     Elevated cholesterol     Heartburn     Hypertension     Low iron     Thyroid activity decreased        PAST SURGICAL HISTORY:  Past Surgical History:   Procedure Laterality Date    BREAST BIOPSY Right 10/29/2020    Procedure: BREAST BIOPSY WITH NEEDLE LOCALIZATION;  Surgeon: Dao Cano MD;  Location: [facility];  Service: General;  Laterality: Right;    GALLBLADDER SURGERY      WRIST SURGERY         SOCIAL HISTORY:  Social History     Socioeconomic History    Marital status:    Tobacco Use    Smoking status: Never    Smokeless tobacco: Never   Vaping Use    Vaping Use: Never used   Substance and Sexual Activity    Alcohol use: Never    Drug use: Never    Sexual activity: Defer           FAMILY HISTORY:  Family History   Problem Relation Age of Onset    COPD Mother     Hypertension Mother     Skin cancer Mother     Heart disease Father     Diabetes Father     Hyperlipidemia Father          MEDICATIONS:  The current medication list was reviewed in the EMR    Current Outpatient Medications:     acetaminophen (TYLENOL) 325 MG tablet, Take 2 tablets by mouth Every 4 (Four) Hours As Needed for Mild Pain ., Disp: 30 tablet, Rfl: 0    cyanocobalamin (VITAMIN B-12) 2500 MCG tablet tablet, Take 1 tablet by mouth Daily., Disp: 30 tablet, Rfl: 5    fenofibrate (TRICOR) 145 MG tablet, Take 1 tablet by mouth Daily., Disp: , Rfl:     Ferrex 150 150 MG capsule, Take 1 capsule by mouth Daily As Needed., Disp: , Rfl:     folic acid (FOLVITE) 1 MG tablet, Take 1 tablet by mouth  Daily., Disp: 30 tablet, Rfl: 5    gabapentin (NEURONTIN) 300 MG capsule, TAKE 1 (ONE) CAPSULE BY MOUTH AT BEDTIME, Disp: , Rfl:     ibuprofen (ADVIL,MOTRIN) 600 MG tablet, Take 1 tablet by mouth Every 6 (Six) Hours As Needed for Mild Pain ., Disp: 30 tablet, Rfl: 0    irbesartan (AVAPRO) 150 MG tablet, Take 1 tablet by mouth Daily., Disp: , Rfl:     Jardiance 25 MG tablet, Take 1 tablet by mouth Daily., Disp: , Rfl:     levocetirizine (XYZAL) 5 MG tablet, Take 1 tablet by mouth Every Evening., Disp: , Rfl:     levothyroxine (SYNTHROID, LEVOTHROID) 75 MCG tablet, Take 125 mcg by mouth Daily., Disp: , Rfl:     montelukast (SINGULAIR) 10 MG tablet, Take 1 tablet by mouth Every Night., Disp: , Rfl:     Ozempic, 2 MG/DOSE, 8 MG/3ML solution pen-injector, INJECT 2MG UNDER THE SKIN ONCE WEEKLY, Disp: , Rfl:     traMADol (ULTRAM) 50 MG tablet, Take 1 tablet by mouth Every 8 (Eight) Hours As Needed for Moderate Pain ., Disp: 6 tablet, Rfl: 0    vitamin D (ERGOCALCIFEROL) 1.25 MG (18775 UT) capsule capsule, Take 1 capsule by mouth Daily., Disp: , Rfl:     ALLERGIES:    Allergies   Allergen Reactions    Mushroom Other (See Comments)     PT REPORTS THROAT TINGLES AND MOUTH GET NUMB         REVIEW OF SYSTEMS:    A comprehensive 14 point review of systems was performed.  Significant findings as mentioned above.  All other systems reviewed and are negative.        Physical Exam   Vital Signs:   Vitals:    11/20/23 0822   BP: 129/88   Pulse: 76   Resp: 18   Temp: 97.1 °F (36.2 °C)   SpO2: 98%    Patient's Body mass index is 32.13 kg/m².     ECOG score: 0   General: Well developed, well nourished, alert and oriented x 3, in no acute distress.   Head: ATNC   Eyes: PERRL, No evidence of conjunctivitis.   Nose: No nasal discharge.   Mouth: Oral mucosal membranes moist. No oral ulceration or hemorrhages.   Neck: Neck supple. No thyromegaly. No JVD.   Lungs: Clear in all fields to A&P without rales, rhonchi or wheezing.   Heart: S1, S2.  Regular rate and rhythm. No murmurs, rubs, or gallops.   Abdomen: Soft. Bowel sounds are normoactive. Nontender with palpation. No Hepatosplenomegaly can be appreciated.   Extremities: No clubbing, cyanosis or edema.   Integumentary: Warm, dry, intact.   Neurologic: Grossly non-focal exam.    Pain Score:  Pain Score    11/20/23 0822   PainSc: 0-No pain       PHQ-Score Total:  PHQ-9 Total Score:         IMAGING:        RECENT LABS:  Lab Results   Component Value Date    WBC 8.12 09/20/2023    HGB 12.2 09/20/2023    HCT 40.1 09/20/2023    MCV 82.5 09/20/2023    RDW 14.2 09/20/2023     09/20/2023    NEUTRORELPCT 62.5 09/20/2023    LYMPHORELPCT 29.1 09/20/2023    MONORELPCT 6.2 09/20/2023    EOSRELPCT 1.4 09/20/2023    BASORELPCT 0.4 09/20/2023    NEUTROABS 5.09 09/20/2023    LYMPHSABS 2.36 09/20/2023       Lab Results   Component Value Date     05/03/2021    K 4.3 05/03/2021    CO2 23.6 05/03/2021     05/03/2021    BUN 11 05/03/2021    CREATININE 0.88 05/03/2021    EGFRIFNONA 70 05/03/2021    EGFRIFAFRI 125 01/29/2015    GLUCOSE 81 05/03/2021    CALCIUM 9.7 05/03/2021    ALKPHOS 34 (L) 05/03/2021    AST 24 05/03/2021    ALT 23 05/03/2021    BILITOT 0.3 05/03/2021    ALBUMIN 4.0 09/27/2021    PROTEINTOT 8.1 05/03/2021       Lab Results   Component Value Date     05/03/2021       Lab Results   Component Value Date    FERRITIN 205.40 (H) 09/20/2023    IRON 77 09/20/2023    TIBC 359 09/20/2023    LABIRON 21 09/20/2023    DNDCCAHD09 802 03/15/2023    FOLATE >20.00 03/15/2023    HAPTOGLOBIN 120 05/03/2021    RETICCTPCT 0.90 05/03/2021    RETIC 0.0441 05/03/2021        Work up 05/03/2021      Sed Rate   0 - 20 mm/hr 8      C-Reactive Protein   0.00 - 0.50 mg/dL 0.35      Copper   80 - 158 ug/dL 139      Zinc   44 - 115 ug/dL 84      Tissue Transglutaminase IgA   0 - 3 U/mL <2          ASSESSMENT & PLAN:  Faby Avelar is a very pleasant 48 y.o. female with    1.Microcytic Anemia  2. AURY  3. B12  "deficiency  4. Folate deficiency  - Previous available CBCs were reviewed and noted microyctic anemia, Hg 11.5, in December 2020 and Hg 10.3 in April 2021. Her WBC and platelets were normal. There are no other CBCs available to review for comparison of trend. She reports that she has always struggled with AURY and has been on and off oral iron multiple times in the past. She reports today that she has been on oral iron for the last two years and has not been fully compliant due to abdominal pain, nausea and constipation caused by the supplement.   - She reports that her periods are normal. However, she occasionally struggles with menorrhagia. She follows with GYN and has recommended annual exams.  - Denies blood loss from any source, no melena, rectal bleeding or hematuria. She reports having EGD/colonoscopy in 2006 with Dr. Mak which was unremarkable.   - She denies history of blood transfusions.  - CBC from initial consultation showed Hg 10.7. Iron panel and ferritin were low and most consistent with iron deficiency anemia and anemia of chronic disease/inflammation. Therefore, discontinued oral iron and replaced with IV Feraheme in June 2021.   - Also sent additional labs to further evaluate including PBS (summarized above) which showed microcytic, hypochromic anemia with anisocytosis including \"pencil\" cells, compatible with the provided history of AURY, normal WBC and differential, mature WBC morphology with no granulocytic dysplasia or blasts identified, adequate platelets. B12 and folate were low and she was started on B12 SL 2500 mcg daily and folic acid 1 mg daily. No evidence of hemolysis as Retic, LDH and Haptoglobin were normal. ESR and CRP normal.  Copper, zinc and tissue transglutaminase were normal. To further evaluate anemia obtained serum LINDA which showed no M-spike and serum free light chains revealed normal kappa/lambda ratio.  - Cologuard testing per PCP in January 2022 was negative per patient. "   - She was last replaced with IV Feraheme in June 2023.  - Repeat CBC from today is showing Hg ~12.7, improved. Iron is normal with low Iron saturation and normal ferritin.   - Advised to continue Folic Acid 1 mg daily. Refills provided today.  - Will follow up in 4 months with CBC, Iron panel, Ferritin, B12 and Folate. Will replace with IV Feraheme as needed. She is aware to notify clinic in the interim, if she were to develop worsening fatigue, shortness of breath on exertion, chest pain or dizziness.       ACO / TY/Other  Quality measures  -  Faby Avelar received 2023 flu vaccine.  -  Faby Avelar reports a pain score of 0.    -  Current outpatient and discharge medications have been reconciled for the patient.  Reviewed by: JOHNY Ivan      I spent 30 minutes caring for Faby on this date of service. This time includes time spent by me in the following activities: preparing for the visit, reviewing tests, obtaining and/or reviewing a separately obtained history, performing a medically appropriate examination and/or evaluation, counseling and educating the patient/family/caregiver, ordering medications, tests, or procedures, documenting information in the medical record, independently interpreting results and communicating that information with the patient/family/caregiver, and care coordination.                                       Electronically Signed by: JOHNY Restrepo APRN November 20, 2023 08:23 EST       CC:   No ref. provider found  Dorina Ball MD

## 2023-11-20 NOTE — PROGRESS NOTES
Venipuncture Blood Specimen Collection  Venipuncture performed in right arm by Roxanne Umaña MA with good hemostasis. Patient tolerated the procedure well without complications.   11/20/23   Roxanne Umaña MA

## 2024-03-27 ENCOUNTER — OFFICE VISIT (OUTPATIENT)
Dept: ONCOLOGY | Facility: CLINIC | Age: 49
End: 2024-03-27
Payer: COMMERCIAL

## 2024-03-27 ENCOUNTER — LAB (OUTPATIENT)
Dept: ONCOLOGY | Facility: CLINIC | Age: 49
End: 2024-03-27
Payer: COMMERCIAL

## 2024-03-27 VITALS
RESPIRATION RATE: 18 BRPM | DIASTOLIC BLOOD PRESSURE: 97 MMHG | TEMPERATURE: 96.1 F | SYSTOLIC BLOOD PRESSURE: 145 MMHG | HEART RATE: 77 BPM | BODY MASS INDEX: 30.63 KG/M2 | WEIGHT: 179.4 LBS | OXYGEN SATURATION: 99 % | HEIGHT: 64 IN

## 2024-03-27 DIAGNOSIS — E53.8 B12 DEFICIENCY: ICD-10-CM

## 2024-03-27 DIAGNOSIS — K90.9 INTESTINAL MALABSORPTION, UNSPECIFIED TYPE: ICD-10-CM

## 2024-03-27 DIAGNOSIS — D50.9 IRON DEFICIENCY ANEMIA, UNSPECIFIED IRON DEFICIENCY ANEMIA TYPE: ICD-10-CM

## 2024-03-27 DIAGNOSIS — K90.9 INTESTINAL MALABSORPTION, UNSPECIFIED TYPE: Primary | ICD-10-CM

## 2024-03-27 DIAGNOSIS — E53.8 FOLATE DEFICIENCY: ICD-10-CM

## 2024-03-27 LAB
BASOPHILS # BLD AUTO: 0.02 10*3/MM3 (ref 0–0.2)
BASOPHILS NFR BLD AUTO: 0.2 % (ref 0–1.5)
DEPRECATED RDW RBC AUTO: 41.6 FL (ref 37–54)
EOSINOPHIL # BLD AUTO: 0.1 10*3/MM3 (ref 0–0.4)
EOSINOPHIL NFR BLD AUTO: 0.9 % (ref 0.3–6.2)
ERYTHROCYTE [DISTWIDTH] IN BLOOD BY AUTOMATED COUNT: 14.3 % (ref 12.3–15.4)
FERRITIN SERPL-MCNC: 112 NG/ML (ref 13–150)
FOLATE SERPL-MCNC: >20 NG/ML (ref 4.78–24.2)
HCT VFR BLD AUTO: 40.8 % (ref 34–46.6)
HGB BLD-MCNC: 12.6 G/DL (ref 12–15.9)
IMM GRANULOCYTES # BLD AUTO: 0.03 10*3/MM3 (ref 0–0.05)
IMM GRANULOCYTES NFR BLD AUTO: 0.3 % (ref 0–0.5)
IRON 24H UR-MRATE: 42 MCG/DL (ref 37–145)
IRON SATN MFR SERPL: 10 % (ref 20–50)
LYMPHOCYTES # BLD AUTO: 2.44 10*3/MM3 (ref 0.7–3.1)
LYMPHOCYTES NFR BLD AUTO: 23 % (ref 19.6–45.3)
MCH RBC QN AUTO: 24.7 PG (ref 26.6–33)
MCHC RBC AUTO-ENTMCNC: 30.9 G/DL (ref 31.5–35.7)
MCV RBC AUTO: 80 FL (ref 79–97)
MONOCYTES # BLD AUTO: 0.59 10*3/MM3 (ref 0.1–0.9)
MONOCYTES NFR BLD AUTO: 5.6 % (ref 5–12)
NEUTROPHILS NFR BLD AUTO: 7.42 10*3/MM3 (ref 1.7–7)
NEUTROPHILS NFR BLD AUTO: 70 % (ref 42.7–76)
NRBC BLD AUTO-RTO: 0 /100 WBC (ref 0–0.2)
PLATELET # BLD AUTO: 295 10*3/MM3 (ref 140–450)
PMV BLD AUTO: 10.1 FL (ref 6–12)
RBC # BLD AUTO: 5.1 10*6/MM3 (ref 3.77–5.28)
TIBC SERPL-MCNC: 405 MCG/DL (ref 298–536)
TRANSFERRIN SERPL-MCNC: 272 MG/DL (ref 200–360)
VIT B12 BLD-MCNC: 403 PG/ML (ref 211–946)
WBC NRBC COR # BLD AUTO: 10.6 10*3/MM3 (ref 3.4–10.8)

## 2024-03-27 PROCEDURE — 82746 ASSAY OF FOLIC ACID SERUM: CPT | Performed by: NURSE PRACTITIONER

## 2024-03-27 PROCEDURE — 82607 VITAMIN B-12: CPT | Performed by: NURSE PRACTITIONER

## 2024-03-27 PROCEDURE — 99214 OFFICE O/P EST MOD 30 MIN: CPT | Performed by: NURSE PRACTITIONER

## 2024-03-27 PROCEDURE — 85025 COMPLETE CBC W/AUTO DIFF WBC: CPT | Performed by: NURSE PRACTITIONER

## 2024-03-27 PROCEDURE — 83540 ASSAY OF IRON: CPT | Performed by: NURSE PRACTITIONER

## 2024-03-27 PROCEDURE — 84466 ASSAY OF TRANSFERRIN: CPT | Performed by: NURSE PRACTITIONER

## 2024-03-27 PROCEDURE — 82728 ASSAY OF FERRITIN: CPT | Performed by: NURSE PRACTITIONER

## 2024-03-27 RX ORDER — ORAL SEMAGLUTIDE 14 MG/1
1 TABLET ORAL DAILY
COMMUNITY
Start: 2023-12-08

## 2024-03-27 NOTE — PROGRESS NOTES
Venipuncture Blood Specimen Collection  Venipuncture performed in Left arm by Miesha Gayle MA with good hemostasis. Patient tolerated the procedure well without complications.   03/27/24   Miesha Gayle MA

## 2024-03-27 NOTE — PROGRESS NOTES
DATE:  3/27/2024    DIAGNOSIS: AURY    CHIEF COMPLAINT:  Follow Up AURY      TREATMENT HISTORY:  Feraheme Day 1 06/04/2021  Day 8 06/10/2021  Feraheme Day 1 04/04/2022  Day 8 04/11/2022  Feraheme Day 1 06/01/2023  Day 8 06/08/2023    B12 2500 mcg SL daily- Discontinued  Folic Acid 1 mg daily    HISTORY OF PRESENT ILLNESS:   Faby Avelar is a very pleasant 48 y.o. female who is being seen today at the request of Dorina Ball MD for evaluation and treatment of iron deficiency anemia. Ms. Avelar reports following with her PCP every 6 months with routine lab testing. Previous available CBCs were reviewed and noted microyctic anemia, Hg 11.5, in December 2020 and Hg 10.3 in April 2021. Her WBC and platelets were normal. There are no other CBCs available to review for comparison of trend. She reports that she has always struggled with AURY and has been on and off oral iron multiple times in the past. She reports today that she has been on oral iron for the last two years and has not been fully compliant due to abdominal pain, nausea and constipation caused by the supplement. She reports fatigue, shortness of breath on exertion and occasional dizziness. She reports that her periods are normal. However, she occasionally struggles with menorrhagia. She follows with GYN and has recommended annual exams. She otherwise, denies blood loss from any source, no melena, rectal bleeding or hematuria. She reports having EGD/colonoscopy in 2006 with Dr. Mak which was unremarkable. She denies history of blood transfusions. She denies any other specific complaints today.    Interval History:  Ms. Avelar presents today for follow up of AURY. She was last replaced with IV Feraheme in June 2023. She is taking folic acid daily and tolerating this well. Overall, she reports that she has been feeling well. She denies fatigue, shortness of breath on exertion, chest pain or dizziness. She reports normal menstrual periods. Otherwise,  she denies blood loss from any other source. She denies any other specific complaints today.     The following portions of the patient's history were reviewed and updated as appropriate: allergies, current medications, past family history, past medical history, past social history, past surgical history and problem list.    PAST MEDICAL HISTORY:  Past Medical History:   Diagnosis Date    Diabetes mellitus     Elevated cholesterol     Heartburn     Hypertension     Low iron     Thyroid activity decreased        PAST SURGICAL HISTORY:  Past Surgical History:   Procedure Laterality Date    BREAST BIOPSY Right 10/29/2020    Procedure: BREAST BIOPSY WITH NEEDLE LOCALIZATION;  Surgeon: Dao Cano MD;  Location: [facility];  Service: General;  Laterality: Right;    GALLBLADDER SURGERY      WRIST SURGERY         SOCIAL HISTORY:  Social History     Socioeconomic History    Marital status:    Tobacco Use    Smoking status: Never    Smokeless tobacco: Never   Vaping Use    Vaping status: Never Used   Substance and Sexual Activity    Alcohol use: Never    Drug use: Never    Sexual activity: Defer           FAMILY HISTORY:  Family History   Problem Relation Age of Onset    COPD Mother     Hypertension Mother     Skin cancer Mother     Heart disease Father     Diabetes Father     Hyperlipidemia Father          MEDICATIONS:  The current medication list was reviewed in the EMR    Current Outpatient Medications:     cyanocobalamin (VITAMIN B-12) 2500 MCG tablet tablet, Take 1 tablet by mouth Daily., Disp: 30 tablet, Rfl: 5    folic acid (FOLVITE) 1 MG tablet, Take 1 tablet by mouth Daily., Disp: 30 tablet, Rfl: 5    gabapentin (NEURONTIN) 300 MG capsule, TAKE 1 (ONE) CAPSULE BY MOUTH AT BEDTIME, Disp: , Rfl:     irbesartan (AVAPRO) 150 MG tablet, Take 1 tablet by mouth Daily., Disp: , Rfl:     levocetirizine (XYZAL) 5 MG tablet, Take 1 tablet by mouth Every Evening., Disp: , Rfl:     levothyroxine (SYNTHROID,  LEVOTHROID) 75 MCG tablet, Take 1 tablet by mouth Daily., Disp: , Rfl:     montelukast (SINGULAIR) 10 MG tablet, Take 1 tablet by mouth Every Night., Disp: , Rfl:     Rybelsus 14 MG tablet, Take 1 tablet by mouth Daily., Disp: , Rfl:     vitamin D (ERGOCALCIFEROL) 1.25 MG (87619 UT) capsule capsule, Take 1 capsule by mouth Daily., Disp: , Rfl:     acetaminophen (TYLENOL) 325 MG tablet, Take 2 tablets by mouth Every 4 (Four) Hours As Needed for Mild Pain ., Disp: 30 tablet, Rfl: 0    fenofibrate (TRICOR) 145 MG tablet, Take 1 tablet by mouth Daily., Disp: , Rfl:     Ferrex 150 150 MG capsule, Take 1 capsule by mouth Daily As Needed., Disp: , Rfl:     ibuprofen (ADVIL,MOTRIN) 600 MG tablet, Take 1 tablet by mouth Every 6 (Six) Hours As Needed for Mild Pain ., Disp: 30 tablet, Rfl: 0    Jardiance 25 MG tablet, Take 1 tablet by mouth Daily., Disp: , Rfl:     Ozempic, 2 MG/DOSE, 8 MG/3ML solution pen-injector, INJECT 2MG UNDER THE SKIN ONCE WEEKLY, Disp: , Rfl:     traMADol (ULTRAM) 50 MG tablet, Take 1 tablet by mouth Every 8 (Eight) Hours As Needed for Moderate Pain ., Disp: 6 tablet, Rfl: 0    ALLERGIES:    Allergies   Allergen Reactions    Mushroom Other (See Comments)     PT REPORTS THROAT TINGLES AND MOUTH GET NUMB         REVIEW OF SYSTEMS:    A comprehensive 14 point review of systems was performed.  Significant findings as mentioned above.  All other systems reviewed and are negative.        Physical Exam   Vital Signs:   Vitals:    03/27/24 0900   BP: 145/97   Pulse: 77   Resp: 18   Temp: 96.1 °F (35.6 °C)   SpO2: 99%    Patient's Body mass index is 30.78 kg/m².     ECOG score: 0   General: Well developed, well nourished, alert and oriented x 3, in no acute distress.   Head: ATNC   Eyes: PERRL, No evidence of conjunctivitis.   Nose: No nasal discharge.   Mouth: Oral mucosal membranes moist. No oral ulceration or hemorrhages.   Neck: Neck supple. No thyromegaly. No JVD.   Lungs: Clear in all fields to A&P  without rales, rhonchi or wheezing.   Heart: S1, S2. Regular rate and rhythm. No murmurs, rubs, or gallops.   Abdomen: Soft. Bowel sounds are normoactive. Nontender with palpation. No Hepatosplenomegaly can be appreciated.   Extremities: No clubbing, cyanosis or edema.   Integumentary: Warm, dry, intact.   Neurologic: Grossly non-focal exam.    Pain Score:  Pain Score    03/27/24 0900   PainSc: 0-No pain       PHQ-Score Total:  PHQ-9 Total Score:         IMAGING:        RECENT LABS:  Lab Results   Component Value Date    WBC 10.60 03/27/2024    HGB 12.6 03/27/2024    HCT 40.8 03/27/2024    MCV 80.0 03/27/2024    RDW 14.3 03/27/2024     03/27/2024    NEUTRORELPCT 70.0 03/27/2024    LYMPHORELPCT 23.0 03/27/2024    MONORELPCT 5.6 03/27/2024    EOSRELPCT 0.9 03/27/2024    BASORELPCT 0.2 03/27/2024    NEUTROABS 7.42 (H) 03/27/2024    LYMPHSABS 2.44 03/27/2024       Lab Results   Component Value Date     05/03/2021    K 4.3 05/03/2021    CO2 23.6 05/03/2021     05/03/2021    BUN 11 05/03/2021    CREATININE 0.88 05/03/2021    EGFRIFNONA 70 05/03/2021    EGFRIFAFRI 125 01/29/2015    GLUCOSE 81 05/03/2021    CALCIUM 9.7 05/03/2021    ALKPHOS 34 (L) 05/03/2021    AST 24 05/03/2021    ALT 23 05/03/2021    BILITOT 0.3 05/03/2021    ALBUMIN 4.0 09/27/2021    PROTEINTOT 8.1 05/03/2021       Lab Results   Component Value Date     05/03/2021       Lab Results   Component Value Date    FERRITIN 143.30 11/20/2023    IRON 62 11/20/2023    TIBC 393 11/20/2023    LABIRON 16 (L) 11/20/2023    JOKREPFM21 802 03/15/2023    FOLATE >20.00 03/15/2023    HAPTOGLOBIN 120 05/03/2021    RETICCTPCT 0.90 05/03/2021    RETIC 0.0441 05/03/2021        Work up 05/03/2021      Sed Rate   0 - 20 mm/hr 8      C-Reactive Protein   0.00 - 0.50 mg/dL 0.35      Copper   80 - 158 ug/dL 139      Zinc   44 - 115 ug/dL 84      Tissue Transglutaminase IgA   0 - 3 U/mL <2          ASSESSMENT & PLAN:  Faby Avelar is a very pleasant 48  "y.o. female with    1.Microcytic Anemia  2. AURY  3. B12 deficiency  4. Folate deficiency  - Previous available CBCs were reviewed and noted microyctic anemia, Hg 11.5, in December 2020 and Hg 10.3 in April 2021. Her WBC and platelets were normal. There are no other CBCs available to review for comparison of trend. She reports that she has always struggled with AURY and has been on and off oral iron multiple times in the past. She reports today that she has been on oral iron for the last two years and has not been fully compliant due to abdominal pain, nausea and constipation caused by the supplement.   - She reports that her periods are normal. However, she occasionally struggles with menorrhagia. She follows with GYN and has recommended annual exams.  - Denies blood loss from any source, no melena, rectal bleeding or hematuria. She reports having EGD/colonoscopy in 2006 with Dr. Mak which was unremarkable.   - She denies history of blood transfusions.  - CBC from initial consultation showed Hg 10.7. Iron panel and ferritin were low and most consistent with iron deficiency anemia and anemia of chronic disease/inflammation. Therefore, discontinued oral iron and replaced with IV Feraheme in June 2021.   - Also sent additional labs to further evaluate including PBS (summarized above) which showed microcytic, hypochromic anemia with anisocytosis including \"pencil\" cells, compatible with the provided history of AURY, normal WBC and differential, mature WBC morphology with no granulocytic dysplasia or blasts identified, adequate platelets. B12 and folate were low and she was started on B12 SL 2500 mcg daily and folic acid 1 mg daily. No evidence of hemolysis as Retic, LDH and Haptoglobin were normal. ESR and CRP normal.  Copper, zinc and tissue transglutaminase were normal. To further evaluate anemia obtained serum LINDA which showed no M-spike and serum free light chains revealed normal kappa/lambda ratio.  - Cologuard " testing per PCP in January 2022 was negative per patient.   - She was last replaced with IV Feraheme in June 2023.  - Repeat CBC from today is showing Hg ~12.6, stable. Iron is normal with low Iron saturation and normal ferritin. B12 and Folate are pending from today.  - Advised to continue Folic Acid 1 mg daily.   - Will follow up in 4 months with CBC, Iron panel and Ferritin. Will replace with IV Feraheme as needed. She is aware to notify clinic in the interim, if she were to develop worsening fatigue, shortness of breath on exertion, chest pain or dizziness.       ACO / TY/Other  Quality measures  -  Faby Avelar received 2023 flu vaccine.  -  Faby Avelar reports a pain score of 0.    -  Current outpatient and discharge medications have been reconciled for the patient.  Reviewed by: JOHNY Ivan            I spent 30 minutes caring for Faby on this date of service. This time includes time spent by me in the following activities: preparing for the visit, reviewing tests, obtaining and/or reviewing a separately obtained history, performing a medically appropriate examination and/or evaluation, counseling and educating the patient/family/caregiver, ordering medications, tests, or procedures, documenting information in the medical record, independently interpreting results and communicating that information with the patient/family/caregiver, and care coordination.                                       Electronically Signed by: JOHNY Restrepo APRN March 27, 2024 09:20 EDT       CC:   No ref. provider found  Zoë Multani PA

## 2024-07-31 ENCOUNTER — OFFICE VISIT (OUTPATIENT)
Dept: ONCOLOGY | Facility: CLINIC | Age: 49
End: 2024-07-31
Payer: COMMERCIAL

## 2024-07-31 ENCOUNTER — LAB (OUTPATIENT)
Dept: ONCOLOGY | Facility: CLINIC | Age: 49
End: 2024-07-31
Payer: COMMERCIAL

## 2024-07-31 VITALS
BODY MASS INDEX: 30.39 KG/M2 | HEIGHT: 64 IN | TEMPERATURE: 98 F | WEIGHT: 178 LBS | SYSTOLIC BLOOD PRESSURE: 124 MMHG | DIASTOLIC BLOOD PRESSURE: 85 MMHG | OXYGEN SATURATION: 98 % | HEART RATE: 74 BPM | RESPIRATION RATE: 20 BRPM

## 2024-07-31 DIAGNOSIS — K90.9 INTESTINAL MALABSORPTION, UNSPECIFIED TYPE: ICD-10-CM

## 2024-07-31 DIAGNOSIS — D50.9 IRON DEFICIENCY ANEMIA, UNSPECIFIED IRON DEFICIENCY ANEMIA TYPE: ICD-10-CM

## 2024-07-31 DIAGNOSIS — D50.9 IRON DEFICIENCY ANEMIA, UNSPECIFIED IRON DEFICIENCY ANEMIA TYPE: Primary | ICD-10-CM

## 2024-07-31 LAB
BASOPHILS # BLD AUTO: 0.02 10*3/MM3 (ref 0–0.2)
BASOPHILS NFR BLD AUTO: 0.3 % (ref 0–1.5)
DEPRECATED RDW RBC AUTO: 40.5 FL (ref 37–54)
EOSINOPHIL # BLD AUTO: 0.12 10*3/MM3 (ref 0–0.4)
EOSINOPHIL NFR BLD AUTO: 1.8 % (ref 0.3–6.2)
ERYTHROCYTE [DISTWIDTH] IN BLOOD BY AUTOMATED COUNT: 13.9 % (ref 12.3–15.4)
FERRITIN SERPL-MCNC: 25.88 NG/ML (ref 13–150)
HCT VFR BLD AUTO: 36.6 % (ref 34–46.6)
HGB BLD-MCNC: 11.1 G/DL (ref 12–15.9)
IMM GRANULOCYTES # BLD AUTO: 0.01 10*3/MM3 (ref 0–0.05)
IMM GRANULOCYTES NFR BLD AUTO: 0.1 % (ref 0–0.5)
IRON 24H UR-MRATE: 36 MCG/DL (ref 37–145)
IRON SATN MFR SERPL: 9 % (ref 20–50)
LYMPHOCYTES # BLD AUTO: 1.84 10*3/MM3 (ref 0.7–3.1)
LYMPHOCYTES NFR BLD AUTO: 27.5 % (ref 19.6–45.3)
MCH RBC QN AUTO: 24.4 PG (ref 26.6–33)
MCHC RBC AUTO-ENTMCNC: 30.3 G/DL (ref 31.5–35.7)
MCV RBC AUTO: 80.6 FL (ref 79–97)
MONOCYTES # BLD AUTO: 0.44 10*3/MM3 (ref 0.1–0.9)
MONOCYTES NFR BLD AUTO: 6.6 % (ref 5–12)
NEUTROPHILS NFR BLD AUTO: 4.27 10*3/MM3 (ref 1.7–7)
NEUTROPHILS NFR BLD AUTO: 63.7 % (ref 42.7–76)
NRBC BLD AUTO-RTO: 0 /100 WBC (ref 0–0.2)
PLATELET # BLD AUTO: 279 10*3/MM3 (ref 140–450)
PMV BLD AUTO: 10.3 FL (ref 6–12)
RBC # BLD AUTO: 4.54 10*6/MM3 (ref 3.77–5.28)
TIBC SERPL-MCNC: 411 MCG/DL (ref 298–536)
TRANSFERRIN SERPL-MCNC: 276 MG/DL (ref 200–360)
WBC NRBC COR # BLD AUTO: 6.7 10*3/MM3 (ref 3.4–10.8)

## 2024-07-31 PROCEDURE — 82728 ASSAY OF FERRITIN: CPT | Performed by: NURSE PRACTITIONER

## 2024-07-31 PROCEDURE — 85025 COMPLETE CBC W/AUTO DIFF WBC: CPT | Performed by: NURSE PRACTITIONER

## 2024-07-31 PROCEDURE — 84466 ASSAY OF TRANSFERRIN: CPT | Performed by: NURSE PRACTITIONER

## 2024-07-31 PROCEDURE — 83540 ASSAY OF IRON: CPT | Performed by: NURSE PRACTITIONER

## 2024-07-31 PROCEDURE — 99214 OFFICE O/P EST MOD 30 MIN: CPT | Performed by: NURSE PRACTITIONER

## 2024-07-31 RX ORDER — ROSUVASTATIN CALCIUM 5 MG/1
5 TABLET, COATED ORAL DAILY
COMMUNITY
Start: 2024-07-23

## 2024-07-31 RX ORDER — SODIUM CHLORIDE 9 MG/ML
250 INJECTION, SOLUTION INTRAVENOUS ONCE
OUTPATIENT
Start: 2024-07-31

## 2024-07-31 RX ORDER — CETIRIZINE HYDROCHLORIDE 5 MG/1
5 TABLET ORAL DAILY
COMMUNITY
Start: 2024-07-23

## 2024-07-31 NOTE — PROGRESS NOTES
Venipuncture Blood Specimen Collection  Venipuncture performed in right arm by Choco Sarabai MA with good hemostasis. Patient tolerated the procedure well without complications.   07/31/24   Choco Sarabia MA

## 2024-07-31 NOTE — PROGRESS NOTES
DATE:  7/31/2024    DIAGNOSIS: AURY    CHIEF COMPLAINT:  Fatigue, Dizziness, Shortness of Breath on Exertion      TREATMENT HISTORY:  Feraheme Day 1 06/04/2021  Day 8 06/10/2021  Feraheme Day 1 04/04/2022  Day 8 04/11/2022  Feraheme Day 1 06/01/2023  Day 8 06/08/2023    B12 2500 mcg SL daily- Discontinued  Folic Acid 1 mg daily    HISTORY OF PRESENT ILLNESS:   Faby Avelar is a very pleasant 48 y.o. female who is being seen today at the request of Dorina Ball MD for evaluation and treatment of iron deficiency anemia. Ms. Avelar reports following with her PCP every 6 months with routine lab testing. Previous available CBCs were reviewed and noted microyctic anemia, Hg 11.5, in December 2020 and Hg 10.3 in April 2021. Her WBC and platelets were normal. There are no other CBCs available to review for comparison of trend. She reports that she has always struggled with AURY and has been on and off oral iron multiple times in the past. She reports today that she has been on oral iron for the last two years and has not been fully compliant due to abdominal pain, nausea and constipation caused by the supplement. She reports fatigue, shortness of breath on exertion and occasional dizziness. She reports that her periods are normal. However, she occasionally struggles with menorrhagia. She follows with GYN and has recommended annual exams. She otherwise, denies blood loss from any source, no melena, rectal bleeding or hematuria. She reports having EGD/colonoscopy in 2006 with Dr. Mak which was unremarkable. She denies history of blood transfusions. She denies any other specific complaints today.    Interval History:  Ms. Avelar presents today for follow up of AURY. She was last replaced with IV Feraheme in June 2023. She is taking folic acid daily and tolerating this well. She reports that  over the last couple of weeks she has been struggling with increasing fatigue, shortness of breath on exertion and dizziness.  Denies chest pain. She reports normal menstrual periods. Otherwise, she denies blood loss from any other source. She states that she had cologuard approximately two years ago. She denies any other specific complaints today.     The following portions of the patient's history were reviewed and updated as appropriate: allergies, current medications, past family history, past medical history, past social history, past surgical history and problem list.    PAST MEDICAL HISTORY:  Past Medical History:   Diagnosis Date    Diabetes mellitus     Elevated cholesterol     Heartburn     Hypertension     Low iron     Thyroid activity decreased        PAST SURGICAL HISTORY:  Past Surgical History:   Procedure Laterality Date    BREAST BIOPSY Right 10/29/2020    Procedure: BREAST BIOPSY WITH NEEDLE LOCALIZATION;  Surgeon: Dao Cano MD;  Location: [facility];  Service: General;  Laterality: Right;    GALLBLADDER SURGERY      WRIST SURGERY         SOCIAL HISTORY:  Social History     Socioeconomic History    Marital status:    Tobacco Use    Smoking status: Never    Smokeless tobacco: Never   Vaping Use    Vaping status: Never Used   Substance and Sexual Activity    Alcohol use: Never    Drug use: Never    Sexual activity: Defer           FAMILY HISTORY:  Family History   Problem Relation Age of Onset    COPD Mother     Hypertension Mother     Skin cancer Mother     Heart disease Father     Diabetes Father     Hyperlipidemia Father          MEDICATIONS:  The current medication list was reviewed in the EMR    Current Outpatient Medications:     acetaminophen (TYLENOL) 325 MG tablet, Take 2 tablets by mouth Every 4 (Four) Hours As Needed for Mild Pain ., Disp: 30 tablet, Rfl: 0    cetirizine (zyrTEC) 5 MG tablet, Take 1 tablet by mouth Daily., Disp: , Rfl:     cyanocobalamin (VITAMIN B-12) 2500 MCG tablet tablet, Take 1 tablet by mouth Daily., Disp: 30 tablet, Rfl: 5    folic acid (FOLVITE) 1 MG tablet, Take 1 tablet  by mouth Daily., Disp: 30 tablet, Rfl: 5    gabapentin (NEURONTIN) 300 MG capsule, TAKE 1 (ONE) CAPSULE BY MOUTH AT BEDTIME, Disp: , Rfl:     irbesartan (AVAPRO) 150 MG tablet, Take 1 tablet by mouth Daily., Disp: , Rfl:     levothyroxine (SYNTHROID, LEVOTHROID) 75 MCG tablet, Take 1 tablet by mouth Daily., Disp: , Rfl:     montelukast (SINGULAIR) 10 MG tablet, Take 1 tablet by mouth Every Night., Disp: , Rfl:     rosuvastatin (CRESTOR) 5 MG tablet, Take 1 tablet by mouth Daily., Disp: , Rfl:     Rybelsus 14 MG tablet, Take 1 tablet by mouth Daily., Disp: , Rfl:     vitamin D (ERGOCALCIFEROL) 1.25 MG (77259 UT) capsule capsule, Take 1 capsule by mouth Daily., Disp: , Rfl:     fenofibrate (TRICOR) 145 MG tablet, Take 1 tablet by mouth Daily. (Patient not taking: Reported on 7/31/2024), Disp: , Rfl:     Ferrex 150 150 MG capsule, Take 1 capsule by mouth Daily As Needed. (Patient not taking: Reported on 7/31/2024), Disp: , Rfl:     ibuprofen (ADVIL,MOTRIN) 600 MG tablet, Take 1 tablet by mouth Every 6 (Six) Hours As Needed for Mild Pain ., Disp: 30 tablet, Rfl: 0    Jardiance 25 MG tablet, Take 1 tablet by mouth Daily., Disp: , Rfl:     levocetirizine (XYZAL) 5 MG tablet, Take 1 tablet by mouth Every Evening. (Patient not taking: Reported on 7/31/2024), Disp: , Rfl:     Ozempic, 2 MG/DOSE, 8 MG/3ML solution pen-injector, INJECT 2MG UNDER THE SKIN ONCE WEEKLY, Disp: , Rfl:     traMADol (ULTRAM) 50 MG tablet, Take 1 tablet by mouth Every 8 (Eight) Hours As Needed for Moderate Pain ., Disp: 6 tablet, Rfl: 0    ALLERGIES:    Allergies   Allergen Reactions    Mushroom Other (See Comments)     PT REPORTS THROAT TINGLES AND MOUTH GET NUMB         REVIEW OF SYSTEMS:    A comprehensive 14 point review of systems was performed.  Significant findings as mentioned above.  All other systems reviewed and are negative.        Physical Exam   Vital Signs:   Vitals:    07/31/24 0956   BP: 124/85   Pulse: 74   Resp: 20   Temp: 98 °F  (36.7 °C)   SpO2: 98%      Patient's Body mass index is 30.55 kg/m².     ECOG score: 0   General: Well developed, well nourished, alert and oriented x 3, in no acute distress.   Head: ATNC   Eyes: PERRL, No evidence of conjunctivitis.   Nose: No nasal discharge.   Mouth: Oral mucosal membranes moist. No oral ulceration or hemorrhages.   Neck: Neck supple. No thyromegaly. No JVD.   Lungs: Clear in all fields to A&P without rales, rhonchi or wheezing.   Heart: S1, S2. Regular rate and rhythm. No murmurs, rubs, or gallops.   Abdomen: Soft. Bowel sounds are normoactive. Nontender with palpation. No Hepatosplenomegaly can be appreciated.   Extremities: No clubbing, cyanosis or edema.   Integumentary: Warm, dry, intact.   Neurologic: Grossly non-focal exam.    Pain Score:  Pain Score    07/31/24 0956   PainSc: 0-No pain         PHQ-Score Total:  PHQ-9 Total Score:         IMAGING:        RECENT LABS:  Lab Results   Component Value Date    WBC 6.70 07/31/2024    HGB 11.1 (L) 07/31/2024    HCT 36.6 07/31/2024    MCV 80.6 07/31/2024    RDW 13.9 07/31/2024     07/31/2024    NEUTRORELPCT 63.7 07/31/2024    LYMPHORELPCT 27.5 07/31/2024    MONORELPCT 6.6 07/31/2024    EOSRELPCT 1.8 07/31/2024    BASORELPCT 0.3 07/31/2024    NEUTROABS 4.27 07/31/2024    LYMPHSABS 1.84 07/31/2024       Lab Results   Component Value Date     05/03/2021    K 4.3 05/03/2021    CO2 23.6 05/03/2021     05/03/2021    BUN 11 05/03/2021    CREATININE 0.88 05/03/2021    EGFRIFNONA 70 05/03/2021    EGFRIFAFRI 125 01/29/2015    GLUCOSE 81 05/03/2021    CALCIUM 9.7 05/03/2021    ALKPHOS 34 (L) 05/03/2021    AST 24 05/03/2021    ALT 23 05/03/2021    BILITOT 0.3 05/03/2021    ALBUMIN 4.0 09/27/2021    PROTEINTOT 8.1 05/03/2021       Lab Results   Component Value Date     05/03/2021       Lab Results   Component Value Date    FERRITIN 25.88 07/31/2024    IRON 36 (L) 07/31/2024    TIBC 411 07/31/2024    LABIRON 9 (L) 07/31/2024     "[id number] 403 03/27/2024    FOLATE >20.00 03/27/2024    HAPTOGLOBIN 120 05/03/2021    RETICCTPCT 0.90 05/03/2021    RETIC 0.0441 05/03/2021        Work up 05/03/2021      Sed Rate   0 - 20 mm/hr 8      C-Reactive Protein   0.00 - 0.50 mg/dL 0.35      Copper   80 - 158 ug/dL 139      Zinc   44 - 115 ug/dL 84      Tissue Transglutaminase IgA   0 - 3 U/mL <2          ASSESSMENT & PLAN:  Faby Avelar is a very pleasant 48 y.o. female with    1.Microcytic Anemia  2. AURY  3. B12 deficiency  4. Folate deficiency  - Previous available CBCs were reviewed and noted microyctic anemia, Hg 11.5, in December 2020 and Hg 10.3 in April 2021. Her WBC and platelets were normal. There are no other CBCs available to review for comparison of trend. She reports that she has always struggled with AURY and has been on and off oral iron multiple times in the past. She reports today that she has been on oral iron for the last two years and has not been fully compliant due to abdominal pain, nausea and constipation caused by the supplement.   - She reports that her periods are normal. However, she occasionally struggles with menorrhagia. She follows with GYN and has recommended annual exams.  - Denies blood loss from any source, no melena, rectal bleeding or hematuria. She reports having EGD/colonoscopy in 2006 with Dr. Mak which was unremarkable.   - She denies history of blood transfusions.  - CBC from initial consultation showed Hg 10.7. Iron panel and ferritin were low and most consistent with iron deficiency anemia and anemia of chronic disease/inflammation. Therefore, discontinued oral iron and replaced with IV Feraheme in June 2021.   - Also sent additional labs to further evaluate including PBS (summarized above) which showed microcytic, hypochromic anemia with anisocytosis including \"pencil\" cells, compatible with the provided history of AURY, normal WBC and differential, mature WBC morphology with no granulocytic dysplasia or " blasts identified, adequate platelets. B12 and folate were low and she was started on B12 SL 2500 mcg daily and folic acid 1 mg daily. No evidence of hemolysis as Retic, LDH and Haptoglobin were normal. ESR and CRP normal.  Copper, zinc and tissue transglutaminase were normal. To further evaluate anemia obtained serum LINDA which showed no M-spike and serum free light chains revealed normal kappa/lambda ratio.  - Cologuard testing per PCP in January 2022 was negative per patient.   - She was last replaced with IV Feraheme in June 2023.  - Repeat CBC from today is showing Hg ~11.1, which has dropped. Iron panel and Ferritin are low. Therefore, will replace with IV Feraheme pending insurance approval.  - Advised to continue Folic Acid 1 mg daily.   - Will follow up in 3 months with CBC, Iron panel and Ferritin. Will replace with IV Feraheme as needed. She is aware to notify clinic in the interim, if she were to develop worsening fatigue, shortness of breath on exertion, chest pain or dizziness.       ACO / TY/Other  Quality measures  -  Faby Avelar received 2023 flu vaccine.  -  Faby Avelar reports a pain score of 0.    -  Current outpatient and discharge medications have been reconciled for the patient.  Reviewed by: JOHNY Ivan                          I spent 30 minutes caring for Faby on this date of service. This time includes time spent by me in the following activities: preparing for the visit, reviewing tests, obtaining and/or reviewing a separately obtained history, performing a medically appropriate examination and/or evaluation, counseling and educating the patient/family/caregiver, ordering medications, tests, or procedures, documenting information in the medical record, independently interpreting results and communicating that information with the patient/family/caregiver, and care coordination.                                       Electronically Signed by: JOHNY Restrepo ,  APRN July 31, 2024 11:19 EDT       CC:   No ref. provider found  Zoë Multani PA

## 2024-08-08 ENCOUNTER — INFUSION (OUTPATIENT)
Dept: ONCOLOGY | Facility: HOSPITAL | Age: 49
End: 2024-08-08
Payer: COMMERCIAL

## 2024-08-08 VITALS
RESPIRATION RATE: 18 BRPM | DIASTOLIC BLOOD PRESSURE: 82 MMHG | HEART RATE: 67 BPM | SYSTOLIC BLOOD PRESSURE: 130 MMHG | WEIGHT: 178 LBS | TEMPERATURE: 98.2 F | OXYGEN SATURATION: 99 % | BODY MASS INDEX: 30.55 KG/M2

## 2024-08-08 DIAGNOSIS — K90.9 INTESTINAL MALABSORPTION, UNSPECIFIED TYPE: Primary | ICD-10-CM

## 2024-08-08 DIAGNOSIS — D50.9 IRON DEFICIENCY ANEMIA, UNSPECIFIED IRON DEFICIENCY ANEMIA TYPE: ICD-10-CM

## 2024-08-08 PROCEDURE — 96374 THER/PROPH/DIAG INJ IV PUSH: CPT

## 2024-08-08 PROCEDURE — 25010000002 FERUMOXYTOL 510 MG/17ML SOLUTION: Performed by: NURSE PRACTITIONER

## 2024-08-08 PROCEDURE — 25810000003 SODIUM CHLORIDE 0.9 % SOLUTION: Performed by: NURSE PRACTITIONER

## 2024-08-08 RX ORDER — SODIUM CHLORIDE 9 MG/ML
250 INJECTION, SOLUTION INTRAVENOUS ONCE
Status: COMPLETED | OUTPATIENT
Start: 2024-08-08 | End: 2024-08-08

## 2024-08-08 RX ADMIN — FERUMOXYTOL 510 MG: 510 INJECTION INTRAVENOUS at 14:46

## 2024-08-08 RX ADMIN — SODIUM CHLORIDE 250 ML: 9 INJECTION, SOLUTION INTRAVENOUS at 14:46

## 2024-08-15 ENCOUNTER — INFUSION (OUTPATIENT)
Dept: ONCOLOGY | Facility: HOSPITAL | Age: 49
End: 2024-08-15
Payer: COMMERCIAL

## 2024-08-15 VITALS
SYSTOLIC BLOOD PRESSURE: 115 MMHG | DIASTOLIC BLOOD PRESSURE: 72 MMHG | BODY MASS INDEX: 29.92 KG/M2 | OXYGEN SATURATION: 98 % | HEART RATE: 78 BPM | TEMPERATURE: 97.7 F | WEIGHT: 174.3 LBS | RESPIRATION RATE: 18 BRPM

## 2024-08-15 DIAGNOSIS — K90.9 INTESTINAL MALABSORPTION, UNSPECIFIED TYPE: Primary | ICD-10-CM

## 2024-08-15 DIAGNOSIS — D50.9 IRON DEFICIENCY ANEMIA, UNSPECIFIED IRON DEFICIENCY ANEMIA TYPE: ICD-10-CM

## 2024-08-15 PROCEDURE — 25810000003 SODIUM CHLORIDE 0.9 % SOLUTION: Performed by: NURSE PRACTITIONER

## 2024-08-15 PROCEDURE — 25010000002 FERUMOXYTOL 510 MG/17ML SOLUTION: Performed by: NURSE PRACTITIONER

## 2024-08-15 PROCEDURE — 96365 THER/PROPH/DIAG IV INF INIT: CPT

## 2024-08-15 PROCEDURE — 96374 THER/PROPH/DIAG INJ IV PUSH: CPT

## 2024-08-15 RX ORDER — SODIUM CHLORIDE 9 MG/ML
250 INJECTION, SOLUTION INTRAVENOUS ONCE
Status: COMPLETED | OUTPATIENT
Start: 2024-08-15 | End: 2024-08-15

## 2024-08-15 RX ADMIN — FERUMOXYTOL 510 MG: 510 INJECTION INTRAVENOUS at 14:26

## 2024-08-15 RX ADMIN — SODIUM CHLORIDE 250 ML: 9 INJECTION, SOLUTION INTRAVENOUS at 14:26

## 2024-11-11 ENCOUNTER — OFFICE VISIT (OUTPATIENT)
Dept: ONCOLOGY | Facility: CLINIC | Age: 49
End: 2024-11-11
Payer: COMMERCIAL

## 2024-11-11 ENCOUNTER — LAB (OUTPATIENT)
Dept: ONCOLOGY | Facility: CLINIC | Age: 49
End: 2024-11-11
Payer: COMMERCIAL

## 2024-11-11 VITALS
OXYGEN SATURATION: 98 % | BODY MASS INDEX: 29.52 KG/M2 | DIASTOLIC BLOOD PRESSURE: 80 MMHG | TEMPERATURE: 97.1 F | RESPIRATION RATE: 18 BRPM | WEIGHT: 172 LBS | HEART RATE: 90 BPM | SYSTOLIC BLOOD PRESSURE: 126 MMHG

## 2024-11-11 DIAGNOSIS — K90.9 INTESTINAL MALABSORPTION, UNSPECIFIED TYPE: Primary | ICD-10-CM

## 2024-11-11 DIAGNOSIS — E53.8 FOLATE DEFICIENCY: ICD-10-CM

## 2024-11-11 DIAGNOSIS — K90.9 INTESTINAL MALABSORPTION, UNSPECIFIED TYPE: ICD-10-CM

## 2024-11-11 DIAGNOSIS — D50.9 IRON DEFICIENCY ANEMIA, UNSPECIFIED IRON DEFICIENCY ANEMIA TYPE: ICD-10-CM

## 2024-11-11 DIAGNOSIS — E53.8 B12 DEFICIENCY: ICD-10-CM

## 2024-11-11 LAB
BASOPHILS # BLD AUTO: 0.03 10*3/MM3 (ref 0–0.2)
BASOPHILS NFR BLD AUTO: 0.4 % (ref 0–1.5)
DEPRECATED RDW RBC AUTO: 43.6 FL (ref 37–54)
EOSINOPHIL # BLD AUTO: 0.16 10*3/MM3 (ref 0–0.4)
EOSINOPHIL NFR BLD AUTO: 2.3 % (ref 0.3–6.2)
ERYTHROCYTE [DISTWIDTH] IN BLOOD BY AUTOMATED COUNT: 14.6 % (ref 12.3–15.4)
FERRITIN SERPL-MCNC: 191.4 NG/ML (ref 13–150)
HCT VFR BLD AUTO: 37.6 % (ref 34–46.6)
HGB BLD-MCNC: 11.8 G/DL (ref 12–15.9)
IMM GRANULOCYTES # BLD AUTO: 0.03 10*3/MM3 (ref 0–0.05)
IMM GRANULOCYTES NFR BLD AUTO: 0.4 % (ref 0–0.5)
IRON 24H UR-MRATE: 44 MCG/DL (ref 37–145)
IRON SATN MFR SERPL: 13 % (ref 20–50)
LYMPHOCYTES # BLD AUTO: 2.5 10*3/MM3 (ref 0.7–3.1)
LYMPHOCYTES NFR BLD AUTO: 35.6 % (ref 19.6–45.3)
MCH RBC QN AUTO: 25.5 PG (ref 26.6–33)
MCHC RBC AUTO-ENTMCNC: 31.4 G/DL (ref 31.5–35.7)
MCV RBC AUTO: 81.4 FL (ref 79–97)
MONOCYTES # BLD AUTO: 0.44 10*3/MM3 (ref 0.1–0.9)
MONOCYTES NFR BLD AUTO: 6.3 % (ref 5–12)
NEUTROPHILS NFR BLD AUTO: 3.86 10*3/MM3 (ref 1.7–7)
NEUTROPHILS NFR BLD AUTO: 55 % (ref 42.7–76)
NRBC BLD AUTO-RTO: 0 /100 WBC (ref 0–0.2)
PLATELET # BLD AUTO: 283 10*3/MM3 (ref 140–450)
PMV BLD AUTO: 9.7 FL (ref 6–12)
RBC # BLD AUTO: 4.62 10*6/MM3 (ref 3.77–5.28)
TIBC SERPL-MCNC: 337 MCG/DL (ref 298–536)
TRANSFERRIN SERPL-MCNC: 226 MG/DL (ref 200–360)
WBC NRBC COR # BLD AUTO: 7.02 10*3/MM3 (ref 3.4–10.8)

## 2024-11-11 PROCEDURE — 82728 ASSAY OF FERRITIN: CPT | Performed by: NURSE PRACTITIONER

## 2024-11-11 PROCEDURE — 84466 ASSAY OF TRANSFERRIN: CPT | Performed by: NURSE PRACTITIONER

## 2024-11-11 PROCEDURE — 99214 OFFICE O/P EST MOD 30 MIN: CPT | Performed by: NURSE PRACTITIONER

## 2024-11-11 PROCEDURE — 85025 COMPLETE CBC W/AUTO DIFF WBC: CPT | Performed by: NURSE PRACTITIONER

## 2024-11-11 PROCEDURE — 83540 ASSAY OF IRON: CPT | Performed by: NURSE PRACTITIONER

## 2024-11-11 NOTE — PROGRESS NOTES
Venipuncture Blood Specimen Collection  Venipuncture performed in right arm by Roxanne Umaña MA with good hemostasis. Patient tolerated the procedure well without complications.   11/11/24   Roxanne Umaña MA

## 2024-11-11 NOTE — PROGRESS NOTES
DATE:  11/11/2024    DIAGNOSIS: AURY    CHIEF COMPLAINT:  Follow Up AURY      TREATMENT HISTORY:  Feraheme Day 1 06/04/2021  Day 8 06/10/2021  Feraheme Day 1 04/04/2022  Day 8 04/11/2022  Feraheme Day 1 06/01/2023  Day 8 06/08/2023  Feraheme Day 1 08/08/2024  Day 8 08/15/2024    B12 2500 mcg SL daily- Discontinued  Folic Acid 1 mg daily    HISTORY OF PRESENT ILLNESS:   Faby Avelar is a very pleasant 49 y.o. female who is being seen today at the request of Dorina Ball MD for evaluation and treatment of iron deficiency anemia. Ms. Avelar reports following with her PCP every 6 months with routine lab testing. Previous available CBCs were reviewed and noted microyctic anemia, Hg 11.5, in December 2020 and Hg 10.3 in April 2021. Her WBC and platelets were normal. There are no other CBCs available to review for comparison of trend. She reports that she has always struggled with AURY and has been on and off oral iron multiple times in the past. She reports today that she has been on oral iron for the last two years and has not been fully compliant due to abdominal pain, nausea and constipation caused by the supplement. She reports fatigue, shortness of breath on exertion and occasional dizziness. She reports that her periods are normal. However, she occasionally struggles with menorrhagia. She follows with GYN and has recommended annual exams. She otherwise, denies blood loss from any source, no melena, rectal bleeding or hematuria. She reports having EGD/colonoscopy in 2006 with Dr. Mak which was unremarkable. She denies history of blood transfusions. She denies any other specific complaints today.    Interval History:  Ms. Avelar presents today for follow up of AURY. She was last replaced with IV Feraheme in August 2024. She is taking folic acid daily and tolerating this well. She reports a good energy level. Denies any weakness/fatigue, shortness of breath on exertion or chest pain. She reports normal  menstrual periods. Otherwise, she denies blood loss from any other source. She is planning to repeat Cologuard testing in the upcoming weeks. She denies any other specific complaints today.     The following portions of the patient's history were reviewed and updated as appropriate: allergies, current medications, past family history, past medical history, past social history, past surgical history and problem list.    PAST MEDICAL HISTORY:  Past Medical History:   Diagnosis Date    Diabetes mellitus     Elevated cholesterol     Heartburn     Hypertension     Low iron     Thyroid activity decreased        PAST SURGICAL HISTORY:  Past Surgical History:   Procedure Laterality Date    BREAST BIOPSY Right 10/29/2020    Procedure: BREAST BIOPSY WITH NEEDLE LOCALIZATION;  Surgeon: Dao Cano MD;  Location: [facility];  Service: General;  Laterality: Right;    GALLBLADDER SURGERY      WRIST SURGERY         SOCIAL HISTORY:  Social History     Socioeconomic History    Marital status:    Tobacco Use    Smoking status: Never    Smokeless tobacco: Never   Vaping Use    Vaping status: Never Used   Substance and Sexual Activity    Alcohol use: Never    Drug use: Never    Sexual activity: Defer           FAMILY HISTORY:  Family History   Problem Relation Age of Onset    COPD Mother     Hypertension Mother     Skin cancer Mother     Heart disease Father     Diabetes Father     Hyperlipidemia Father          MEDICATIONS:  The current medication list was reviewed in the EMR    Current Outpatient Medications:     cetirizine (zyrTEC) 5 MG tablet, Take 1 tablet by mouth Daily., Disp: , Rfl:     cyanocobalamin (VITAMIN B-12) 2500 MCG tablet tablet, Take 1 tablet by mouth Daily., Disp: 30 tablet, Rfl: 5    folic acid (FOLVITE) 1 MG tablet, Take 1 tablet by mouth Daily., Disp: 30 tablet, Rfl: 5    gabapentin (NEURONTIN) 300 MG capsule, TAKE 1 (ONE) CAPSULE BY MOUTH AT BEDTIME, Disp: , Rfl:     irbesartan (AVAPRO) 150 MG  tablet, Take 1 tablet by mouth Daily., Disp: , Rfl:     levothyroxine (SYNTHROID, LEVOTHROID) 75 MCG tablet, Take 1 tablet by mouth Daily., Disp: , Rfl:     montelukast (SINGULAIR) 10 MG tablet, Take 1 tablet by mouth Every Night., Disp: , Rfl:     rosuvastatin (CRESTOR) 5 MG tablet, Take 1 tablet by mouth Daily., Disp: , Rfl:     Rybelsus 14 MG tablet, Take 1 tablet by mouth Daily., Disp: , Rfl:     vitamin D (ERGOCALCIFEROL) 1.25 MG (64931 UT) capsule capsule, Take 1 capsule by mouth Daily., Disp: , Rfl:     acetaminophen (TYLENOL) 325 MG tablet, Take 2 tablets by mouth Every 4 (Four) Hours As Needed for Mild Pain ., Disp: 30 tablet, Rfl: 0    fenofibrate (TRICOR) 145 MG tablet, Take 1 tablet by mouth Daily. (Patient not taking: Reported on 7/31/2024), Disp: , Rfl:     Ferrex 150 150 MG capsule, Take 1 capsule by mouth Daily As Needed. (Patient not taking: Reported on 7/31/2024), Disp: , Rfl:     ibuprofen (ADVIL,MOTRIN) 600 MG tablet, Take 1 tablet by mouth Every 6 (Six) Hours As Needed for Mild Pain ., Disp: 30 tablet, Rfl: 0    Jardiance 25 MG tablet, Take 1 tablet by mouth Daily., Disp: , Rfl:     levocetirizine (XYZAL) 5 MG tablet, Take 1 tablet by mouth Every Evening. (Patient not taking: Reported on 11/11/2024), Disp: , Rfl:     Ozempic, 2 MG/DOSE, 8 MG/3ML solution pen-injector, INJECT 2MG UNDER THE SKIN ONCE WEEKLY, Disp: , Rfl:     traMADol (ULTRAM) 50 MG tablet, Take 1 tablet by mouth Every 8 (Eight) Hours As Needed for Moderate Pain ., Disp: 6 tablet, Rfl: 0    ALLERGIES:    Allergies   Allergen Reactions    Mushroom Other (See Comments)     PT REPORTS THROAT TINGLES AND MOUTH GET NUMB         REVIEW OF SYSTEMS:    A comprehensive 14 point review of systems was performed.  Significant findings as mentioned above.  All other systems reviewed and are negative.        Physical Exam   Vital Signs:   Vitals:    11/11/24 1520   BP: 126/80   Pulse: 90   Resp: 18   Temp: 97.1 °F (36.2 °C)   SpO2: 98%         Patient's Body mass index is 29.52 kg/m².       General: Well developed, well nourished, alert and oriented x 3, in no acute distress.   Head: ATNC   Eyes: PERRL, No evidence of conjunctivitis.   Nose: No nasal discharge.   Mouth: Oral mucosal membranes moist. No oral ulceration or hemorrhages.   Neck: Neck supple. No thyromegaly. No JVD.   Lungs: Clear in all fields to A&P without rales, rhonchi or wheezing.   Heart: S1, S2. Regular rate and rhythm. No murmurs, rubs, or gallops.   Abdomen: Soft. Bowel sounds are normoactive. Nontender with palpation. No Hepatosplenomegaly can be appreciated.   Extremities: No clubbing, cyanosis or edema.   Integumentary: Warm, dry, intact.   Neurologic: Grossly non-focal exam.    Pain Score:  Pain Score    11/11/24 1520   PainSc: 0-No pain           PHQ-Score Total:  PHQ-9 Total Score:         IMAGING:        RECENT LABS:  Lab Results   Component Value Date    WBC 7.02 11/11/2024    HGB 11.8 (L) 11/11/2024    HCT 37.6 11/11/2024    MCV 81.4 11/11/2024    RDW 14.6 11/11/2024     11/11/2024    NEUTRORELPCT 55.0 11/11/2024    LYMPHORELPCT 35.6 11/11/2024    MONORELPCT 6.3 11/11/2024    EOSRELPCT 2.3 11/11/2024    BASORELPCT 0.4 11/11/2024    NEUTROABS 3.86 11/11/2024    LYMPHSABS 2.50 11/11/2024       Lab Results   Component Value Date     05/03/2021    K 4.3 05/03/2021    CO2 23.6 05/03/2021     05/03/2021    BUN 11 05/03/2021    CREATININE 0.88 05/03/2021    EGFRIFNONA 70 05/03/2021    EGFRIFAFRI 125 01/29/2015    GLUCOSE 81 05/03/2021    CALCIUM 9.7 05/03/2021    ALKPHOS 34 (L) 05/03/2021    AST 24 05/03/2021    ALT 23 05/03/2021    BILITOT 0.3 05/03/2021    ALBUMIN 4.0 09/27/2021    PROTEINTOT 8.1 05/03/2021       Lab Results   Component Value Date     05/03/2021       Lab Results   Component Value Date    FERRITIN 25.88 07/31/2024    IRON 36 (L) 07/31/2024    TIBC 411 07/31/2024    LABIRON 9 (L) 07/31/2024    [id number] 403 03/27/2024    FOLATE >20.00  "03/27/2024    HAPTOGLOBIN 120 05/03/2021    RETICCTPCT 0.90 05/03/2021    RETIC 0.0441 05/03/2021        Work up 05/03/2021      Sed Rate   0 - 20 mm/hr 8      C-Reactive Protein   0.00 - 0.50 mg/dL 0.35      Copper   80 - 158 ug/dL 139      Zinc   44 - 115 ug/dL 84      Tissue Transglutaminase IgA   0 - 3 U/mL <2          ASSESSMENT & PLAN:  Faby Avelar is a very pleasant 49 y.o. female with    1.Microcytic Anemia  2. AURY  3. B12 deficiency  4. Folate deficiency  - Previous available CBCs were reviewed and noted microyctic anemia, Hg 11.5, in December 2020 and Hg 10.3 in April 2021. Her WBC and platelets were normal. There are no other CBCs available to review for comparison of trend. She reports that she has always struggled with AURY and has been on and off oral iron multiple times in the past. She reports today that she has been on oral iron for the last two years and has not been fully compliant due to abdominal pain, nausea and constipation caused by the supplement.   - She reports that her periods are normal. However, she occasionally struggles with menorrhagia. She follows with GYN and has recommended annual exams.  - Denies blood loss from any source, no melena, rectal bleeding or hematuria. She reports having EGD/colonoscopy in 2006 with Dr. Mak which was unremarkable.   - She denies history of blood transfusions.  - CBC from initial consultation showed Hg 10.7. Iron panel and ferritin were low and most consistent with iron deficiency anemia and anemia of chronic disease/inflammation. Therefore, discontinued oral iron and replaced with IV Feraheme in June 2021.   - Also sent additional labs to further evaluate including PBS (summarized above) which showed microcytic, hypochromic anemia with anisocytosis including \"pencil\" cells, compatible with the provided history of AURY, normal WBC and differential, mature WBC morphology with no granulocytic dysplasia or blasts identified, adequate platelets. B12 " and folate were low and she was started on B12 SL 2500 mcg daily and folic acid 1 mg daily. No evidence of hemolysis as Retic, LDH and Haptoglobin were normal. ESR and CRP normal.  Copper, zinc and tissue transglutaminase were normal. To further evaluate anemia obtained serum LINDA which showed no M-spike and serum free light chains revealed normal kappa/lambda ratio.  - Cologuard testing per PCP in January 2022 was negative per patient. She is planning to repeat in the upcoming weeks.   - She was last replaced with IV Feraheme in August 2024.  - Repeat CBC from today is showing improved Hg ~11.8. Iron panel is low with elevated Ferritin.   - Advised to continue Folic Acid 1 mg daily.   - Will follow up in 4 months with CBC, B12, Folate,  Iron panel and Ferritin. Will replace with IV Feraheme as needed. She is aware to notify clinic in the interim, if she were to develop worsening fatigue, shortness of breath on exertion, chest pain or dizziness.       ACO / TY/Other  Quality measures  -  Faby Avelar received 2024 flu vaccine.  -  Faby Avelar reports a pain score of 0.    -  Current outpatient and discharge medications have been reconciled for the patient.  Reviewed by: JOHNY Ivan                    I spent 30 minutes caring for Faby on this date of service. This time includes time spent by me in the following activities: preparing for the visit, reviewing tests, obtaining and/or reviewing a separately obtained history, performing a medically appropriate examination and/or evaluation, counseling and educating the patient/family/caregiver, ordering medications, tests, or procedures, documenting information in the medical record, independently interpreting results and communicating that information with the patient/family/caregiver, and care coordination.                                       Electronically Signed by: JOHNY Restrepo , JOHNY November 11, 2024 15:48 EST       CC:   No ref.  provider found  Zoë Multani PA

## 2025-03-18 ENCOUNTER — OFFICE VISIT (OUTPATIENT)
Dept: ONCOLOGY | Facility: CLINIC | Age: 50
End: 2025-03-18
Payer: COMMERCIAL

## 2025-03-18 ENCOUNTER — LAB (OUTPATIENT)
Dept: ONCOLOGY | Facility: CLINIC | Age: 50
End: 2025-03-18
Payer: COMMERCIAL

## 2025-03-18 VITALS
BODY MASS INDEX: 29.91 KG/M2 | DIASTOLIC BLOOD PRESSURE: 87 MMHG | TEMPERATURE: 97.3 F | SYSTOLIC BLOOD PRESSURE: 142 MMHG | OXYGEN SATURATION: 98 % | HEART RATE: 77 BPM | RESPIRATION RATE: 18 BRPM | HEIGHT: 64 IN | WEIGHT: 175.2 LBS

## 2025-03-18 DIAGNOSIS — E53.8 B12 DEFICIENCY: ICD-10-CM

## 2025-03-18 DIAGNOSIS — K90.9 INTESTINAL MALABSORPTION, UNSPECIFIED TYPE: ICD-10-CM

## 2025-03-18 DIAGNOSIS — E53.8 FOLATE DEFICIENCY: ICD-10-CM

## 2025-03-18 DIAGNOSIS — D50.9 IRON DEFICIENCY ANEMIA, UNSPECIFIED IRON DEFICIENCY ANEMIA TYPE: Primary | ICD-10-CM

## 2025-03-18 DIAGNOSIS — D50.9 IRON DEFICIENCY ANEMIA, UNSPECIFIED IRON DEFICIENCY ANEMIA TYPE: ICD-10-CM

## 2025-03-18 LAB
BASOPHILS # BLD AUTO: 0.04 10*3/MM3 (ref 0–0.2)
BASOPHILS NFR BLD AUTO: 0.7 % (ref 0–1.5)
DEPRECATED RDW RBC AUTO: 38.4 FL (ref 37–54)
EOSINOPHIL # BLD AUTO: 0.14 10*3/MM3 (ref 0–0.4)
EOSINOPHIL NFR BLD AUTO: 2.3 % (ref 0.3–6.2)
ERYTHROCYTE [DISTWIDTH] IN BLOOD BY AUTOMATED COUNT: 13.3 % (ref 12.3–15.4)
FERRITIN SERPL-MCNC: 59.5 NG/ML (ref 13–150)
FOLATE SERPL-MCNC: 17.7 NG/ML (ref 4.78–24.2)
HCT VFR BLD AUTO: 41.2 % (ref 34–46.6)
HGB BLD-MCNC: 12.7 G/DL (ref 12–15.9)
IMM GRANULOCYTES # BLD AUTO: 0.01 10*3/MM3 (ref 0–0.05)
IMM GRANULOCYTES NFR BLD AUTO: 0.2 % (ref 0–0.5)
IRON 24H UR-MRATE: 59 MCG/DL (ref 37–145)
IRON SATN MFR SERPL: 14 % (ref 20–50)
LYMPHOCYTES # BLD AUTO: 1.74 10*3/MM3 (ref 0.7–3.1)
LYMPHOCYTES NFR BLD AUTO: 28.9 % (ref 19.6–45.3)
MCH RBC QN AUTO: 24.8 PG (ref 26.6–33)
MCHC RBC AUTO-ENTMCNC: 30.8 G/DL (ref 31.5–35.7)
MCV RBC AUTO: 80.3 FL (ref 79–97)
MONOCYTES # BLD AUTO: 0.43 10*3/MM3 (ref 0.1–0.9)
MONOCYTES NFR BLD AUTO: 7.1 % (ref 5–12)
NEUTROPHILS NFR BLD AUTO: 3.66 10*3/MM3 (ref 1.7–7)
NEUTROPHILS NFR BLD AUTO: 60.8 % (ref 42.7–76)
NRBC BLD AUTO-RTO: 0 /100 WBC (ref 0–0.2)
PLATELET # BLD AUTO: 290 10*3/MM3 (ref 140–450)
PMV BLD AUTO: 9.8 FL (ref 6–12)
RBC # BLD AUTO: 5.13 10*6/MM3 (ref 3.77–5.28)
TIBC SERPL-MCNC: 435 MCG/DL (ref 298–536)
TRANSFERRIN SERPL-MCNC: 292 MG/DL (ref 200–360)
VIT B12 BLD-MCNC: 517 PG/ML (ref 211–946)
WBC NRBC COR # BLD AUTO: 6.02 10*3/MM3 (ref 3.4–10.8)

## 2025-03-18 PROCEDURE — 85025 COMPLETE CBC W/AUTO DIFF WBC: CPT | Performed by: NURSE PRACTITIONER

## 2025-03-18 PROCEDURE — 83540 ASSAY OF IRON: CPT | Performed by: NURSE PRACTITIONER

## 2025-03-18 PROCEDURE — 84466 ASSAY OF TRANSFERRIN: CPT | Performed by: NURSE PRACTITIONER

## 2025-03-18 PROCEDURE — 82728 ASSAY OF FERRITIN: CPT | Performed by: NURSE PRACTITIONER

## 2025-03-18 PROCEDURE — 82746 ASSAY OF FOLIC ACID SERUM: CPT | Performed by: NURSE PRACTITIONER

## 2025-03-18 PROCEDURE — 82607 VITAMIN B-12: CPT | Performed by: NURSE PRACTITIONER

## 2025-03-18 PROCEDURE — 99214 OFFICE O/P EST MOD 30 MIN: CPT | Performed by: NURSE PRACTITIONER

## 2025-03-18 RX ORDER — SODIUM CHLORIDE 9 MG/ML
250 INJECTION, SOLUTION INTRAVENOUS ONCE
OUTPATIENT
Start: 2025-03-25

## 2025-03-18 RX ORDER — SODIUM CHLORIDE 9 MG/ML
250 INJECTION, SOLUTION INTRAVENOUS ONCE
OUTPATIENT
Start: 2025-04-01

## 2025-03-18 NOTE — PROGRESS NOTES
DATE:  3/18/2025    DIAGNOSIS: AURY    CHIEF COMPLAINT:  Worsening Fatigue, Heart Palpitations, Shortness of Breath on Exertion      TREATMENT HISTORY:  Feraheme Day 1 06/04/2021  Day 8 06/10/2021  Feraheme Day 1 04/04/2022  Day 8 04/11/2022  Feraheme Day 1 06/01/2023  Day 8 06/08/2023  Feraheme Day 1 08/08/2024  Day 8 08/15/2024    B12 2500 mcg SL daily   Folic Acid 1 mg daily    HISTORY OF PRESENT ILLNESS:   Faby Avelar is a very pleasant 49 y.o. female who is being seen today at the request of Dorina Ball MD for evaluation and treatment of iron deficiency anemia. Ms. Avelar reports following with her PCP every 6 months with routine lab testing. Previous available CBCs were reviewed and noted microyctic anemia, Hg 11.5, in December 2020 and Hg 10.3 in April 2021. Her WBC and platelets were normal. There are no other CBCs available to review for comparison of trend. She reports that she has always struggled with AURY and has been on and off oral iron multiple times in the past. She reports today that she has been on oral iron for the last two years and has not been fully compliant due to abdominal pain, nausea and constipation caused by the supplement. She reports fatigue, shortness of breath on exertion and occasional dizziness. She reports that her periods are normal. However, she occasionally struggles with menorrhagia. She follows with GYN and has recommended annual exams. She otherwise, denies blood loss from any source, no melena, rectal bleeding or hematuria. She reports having EGD/colonoscopy in 2006 with Dr. Mak which was unremarkable. She denies history of blood transfusions. She denies any other specific complaints today.    Interval History:  Ms. Avelar presents today for follow up of AURY. She was last replaced with IV Feraheme in August 2024. She is taking folic acid daily and B12 SL daily and tolerating this well. She reports worsening fatigue, heart palpitations and shortness of breath  on exertion over the last three weeks. Denies any chest pain or dizziness. She reports normal menstrual periods. Otherwise, she denies blood loss from any other source. Cologuard testing was completed in December 2024 and she reports this was unremarkable. She denies any other specific complaints today.     The following portions of the patient's history were reviewed and updated as appropriate: allergies, current medications, past family history, past medical history, past social history, past surgical history and problem list.    PAST MEDICAL HISTORY:  Past Medical History:   Diagnosis Date    Diabetes mellitus     Elevated cholesterol     Heartburn     Hypertension     Low iron     Thyroid activity decreased        PAST SURGICAL HISTORY:  Past Surgical History:   Procedure Laterality Date    BREAST BIOPSY Right 10/29/2020    Procedure: BREAST BIOPSY WITH NEEDLE LOCALIZATION;  Surgeon: Dao Cano MD;  Location: [facility];  Service: General;  Laterality: Right;    GALLBLADDER SURGERY      WRIST SURGERY         SOCIAL HISTORY:  Social History     Socioeconomic History    Marital status:    Tobacco Use    Smoking status: Never    Smokeless tobacco: Never   Vaping Use    Vaping status: Never Used   Substance and Sexual Activity    Alcohol use: Never    Drug use: Never    Sexual activity: Defer           FAMILY HISTORY:  Family History   Problem Relation Age of Onset    COPD Mother     Hypertension Mother     Skin cancer Mother     Heart disease Father     Diabetes Father     Hyperlipidemia Father          MEDICATIONS:  The current medication list was reviewed in the EMR    Current Outpatient Medications:     acetaminophen (TYLENOL) 325 MG tablet, Take 2 tablets by mouth Every 4 (Four) Hours As Needed for Mild Pain ., Disp: 30 tablet, Rfl: 0    cetirizine (zyrTEC) 5 MG tablet, Take 1 tablet by mouth Daily., Disp: , Rfl:     cyanocobalamin (VITAMIN B-12) 2500 MCG tablet tablet, Take 1 tablet by mouth  Daily., Disp: 30 tablet, Rfl: 5    fenofibrate (TRICOR) 145 MG tablet, Take 1 tablet by mouth Daily. (Patient not taking: Reported on 7/31/2024), Disp: , Rfl:     Ferrex 150 150 MG capsule, Take 1 capsule by mouth Daily As Needed. (Patient not taking: Reported on 7/31/2024), Disp: , Rfl:     folic acid (FOLVITE) 1 MG tablet, Take 1 tablet by mouth Daily., Disp: 30 tablet, Rfl: 5    gabapentin (NEURONTIN) 300 MG capsule, TAKE 1 (ONE) CAPSULE BY MOUTH AT BEDTIME, Disp: , Rfl:     ibuprofen (ADVIL,MOTRIN) 600 MG tablet, Take 1 tablet by mouth Every 6 (Six) Hours As Needed for Mild Pain ., Disp: 30 tablet, Rfl: 0    irbesartan (AVAPRO) 150 MG tablet, Take 1 tablet by mouth Daily., Disp: , Rfl:     Jardiance 25 MG tablet, Take 1 tablet by mouth Daily., Disp: , Rfl:     levocetirizine (XYZAL) 5 MG tablet, Take 1 tablet by mouth Every Evening. (Patient not taking: Reported on 7/31/2024), Disp: , Rfl:     levothyroxine (SYNTHROID, LEVOTHROID) 75 MCG tablet, Take 1 tablet by mouth Daily., Disp: , Rfl:     montelukast (SINGULAIR) 10 MG tablet, Take 1 tablet by mouth Every Night., Disp: , Rfl:     Ozempic, 2 MG/DOSE, 8 MG/3ML solution pen-injector, INJECT 2MG UNDER THE SKIN ONCE WEEKLY, Disp: , Rfl:     rosuvastatin (CRESTOR) 5 MG tablet, Take 1 tablet by mouth Daily., Disp: , Rfl:     Rybelsus 14 MG tablet, Take 1 tablet by mouth Daily., Disp: , Rfl:     traMADol (ULTRAM) 50 MG tablet, Take 1 tablet by mouth Every 8 (Eight) Hours As Needed for Moderate Pain ., Disp: 6 tablet, Rfl: 0    vitamin D (ERGOCALCIFEROL) 1.25 MG (31128 UT) capsule capsule, Take 1 capsule by mouth Daily., Disp: , Rfl:     ALLERGIES:    Allergies   Allergen Reactions    Mushroom Other (See Comments)     PT REPORTS THROAT TINGLES AND MOUTH GET NUMB         REVIEW OF SYSTEMS:    A comprehensive 14 point review of systems was performed.  Significant findings as mentioned above.  All other systems reviewed and are negative.        Physical Exam   Vital  Signs:   Vitals:    03/18/25 0925   BP: 142/87   Pulse: 77   Resp: 18   Temp: 97.3 °F (36.3 °C)   SpO2: 98%        Patient's Body mass index is 30.06 kg/m².     ECOG score: 0   General: Well developed, well nourished, alert and oriented x 3, in no acute distress.   Head: ATNC   Eyes: PERRL, No evidence of conjunctivitis.   Nose: No nasal discharge.   Mouth: Oral mucosal membranes moist. No oral ulceration or hemorrhages.   Neck: Neck supple. No thyromegaly. No JVD.   Lungs: Clear in all fields to A&P without rales, rhonchi or wheezing.   Heart: S1, S2. Regular rate and rhythm. No murmurs, rubs, or gallops.   Abdomen: Soft. Bowel sounds are normoactive. Nontender with palpation. No Hepatosplenomegaly can be appreciated.   Extremities: No clubbing, cyanosis or edema.   Integumentary: Warm, dry, intact.   Neurologic: Grossly non-focal exam.    Pain Score:  Pain Score    03/18/25 0925   PainSc: 0-No pain           PHQ-Score Total:  PHQ-9 Total Score:         IMAGING:        RECENT LABS:  Lab Results   Component Value Date    WBC 7.02 11/11/2024    HGB 11.8 (L) 11/11/2024    HCT 37.6 11/11/2024    MCV 81.4 11/11/2024    RDW 14.6 11/11/2024     11/11/2024    NEUTRORELPCT 55.0 11/11/2024    LYMPHORELPCT 35.6 11/11/2024    MONORELPCT 6.3 11/11/2024    EOSRELPCT 2.3 11/11/2024    BASORELPCT 0.4 11/11/2024    NEUTROABS 3.86 11/11/2024    LYMPHSABS 2.50 11/11/2024       Lab Results   Component Value Date     05/03/2021    K 4.3 05/03/2021    CO2 23.6 05/03/2021     05/03/2021    BUN 11 05/03/2021    CREATININE 0.88 05/03/2021    EGFRIFNONA 70 05/03/2021    EGFRIFAFRI 125 01/29/2015    GLUCOSE 81 05/03/2021    CALCIUM 9.7 05/03/2021    ALKPHOS 34 (L) 05/03/2021    AST 24 05/03/2021    ALT 23 05/03/2021    BILITOT 0.3 05/03/2021    ALBUMIN 4.0 09/27/2021    PROTEINTOT 6.9 09/27/2021       Lab Results   Component Value Date     05/03/2021       Lab Results   Component Value Date    FERRITIN 191.40 (H)  "11/11/2024    IRON 44 11/11/2024    TIBC 337 11/11/2024    LABIRON 13 (L) 11/11/2024    HGYDGQTH56 403 03/27/2024    FOLATE >20.00 03/27/2024    HAPTOGLOBIN 120 05/03/2021    RETICCTPCT 0.90 05/03/2021    RETIC 0.0441 05/03/2021        Work up 05/03/2021      Sed Rate   0 - 20 mm/hr 8      C-Reactive Protein   0.00 - 0.50 mg/dL 0.35      Copper   80 - 158 ug/dL 139      Zinc   44 - 115 ug/dL 84      Tissue Transglutaminase IgA   0 - 3 U/mL <2          ASSESSMENT & PLAN:  Faby Avelar is a very pleasant 49 y.o. female with    1.Microcytic Anemia  2. AURY  3. B12 deficiency  4. Folate deficiency  - Previous available CBCs were reviewed and noted microyctic anemia, Hg 11.5, in December 2020 and Hg 10.3 in April 2021. Her WBC and platelets were normal. There are no other CBCs available to review for comparison of trend. She reports that she has always struggled with AURY and has been on and off oral iron multiple times in the past. She reports today that she has been on oral iron for the last two years and has not been fully compliant due to abdominal pain, nausea and constipation caused by the supplement.   - She reports that her periods are normal. However, she occasionally struggles with menorrhagia. She follows with GYN and has recommended annual exams.  - Denies blood loss from any source, no melena, rectal bleeding or hematuria. She reports having EGD/colonoscopy in 2006 with Dr. Mak which was unremarkable.   - She denies history of blood transfusions.  - CBC from initial consultation showed Hg 10.7. Iron panel and ferritin were low and most consistent with iron deficiency anemia and anemia of chronic disease/inflammation. Therefore, discontinued oral iron and replaced with IV Feraheme in June 2021.   - Also sent additional labs to further evaluate including PBS (summarized above) which showed microcytic, hypochromic anemia with anisocytosis including \"pencil\" cells, compatible with the provided history of AURY, " normal WBC and differential, mature WBC morphology with no granulocytic dysplasia or blasts identified, adequate platelets. B12 and folate were low and she was started on B12 SL 2500 mcg daily and folic acid 1 mg daily. No evidence of hemolysis as Retic, LDH and Haptoglobin were normal. ESR and CRP normal.  Copper, zinc and tissue transglutaminase were normal. To further evaluate anemia obtained serum LINDA which showed no M-spike and serum free light chains revealed normal kappa/lambda ratio.  - Cologuard testing per PCP in January 2022 was negative per patient. Repeat Cologuard testing in December 2024 was unremarkable per patient. (Will attempt to request records)  - She was last replaced with IV Feraheme in August 2024.  - Repeat CBC from today is showing improved Hg ~12.7. Iron panel and Ferritin is marginally low. Therefore, will replace with IV Feraheme, pending insurance approval.   - Advised to continue Folic Acid 1 mg and B12 2500 mcg SL daily. Repeat Folate and B12 levels pending from today.   - Will follow up in 4 months with CBC, Iron panel and Ferritin. Will replace with IV Feraheme as needed. She is aware to notify clinic in the interim, if she were to develop worsening fatigue, shortness of breath on exertion, chest pain or dizziness.        ACO / TY/Other  Quality measures  -  Faby Avelar received 2024 flu vaccine.  -  Faby Avelar reports a pain score of 0.   -  Current outpatient and discharge medications have been reconciled for the patient.  Reviewed by: JOHNY Ivan                     I spent 30 minutes caring for Faby on this date of service. This time includes time spent by me in the following activities: preparing for the visit, reviewing tests, obtaining and/or reviewing a separately obtained history, performing a medically appropriate examination and/or evaluation, counseling and educating the patient/family/caregiver, ordering medications, tests, or procedures,  documenting information in the medical record, independently interpreting results and communicating that information with the patient/family/caregiver, and care coordination.                                       Electronically Signed by: JOHNY Restrepo APRN March 18, 2025 09:28 EDT       CC:   No ref. provider found  Zoë Multani PA

## 2025-03-18 NOTE — PROGRESS NOTES
Venipuncture Blood Specimen Collection  Venipuncture performed in right arm by An Yoon MA with good hemostasis. Patient tolerated the procedure well without complications.   03/18/25   An Yoon MA

## 2025-03-24 ENCOUNTER — TELEPHONE (OUTPATIENT)
Dept: ONCOLOGY | Facility: CLINIC | Age: 50
End: 2025-03-24
Payer: COMMERCIAL

## 2025-03-24 NOTE — TELEPHONE ENCOUNTER
Caller: Faby Avelar    Relationship: Self    Best call back number: [phone]    What is the best time to reach you: ANYTIME    Who are you requesting to speak with (clinical staff, provider,  specific staff member): CLINICAL       What was the call regarding:     SAW DENISA LAST WEEK 03/18 AND WAS SUPPOSED TO BE SCHEDULED FOR IRON INFUSION BUT HAS NOT BEEN CONTACTED YET TO SCHEDULE AND WANTED TO CHECK ON THIS     Is it okay if the provider responds through MyChart: YES

## 2025-03-25 ENCOUNTER — INFUSION (OUTPATIENT)
Dept: ONCOLOGY | Facility: HOSPITAL | Age: 50
End: 2025-03-25
Payer: COMMERCIAL

## 2025-03-25 VITALS
OXYGEN SATURATION: 99 % | RESPIRATION RATE: 18 BRPM | WEIGHT: 174.2 LBS | DIASTOLIC BLOOD PRESSURE: 93 MMHG | TEMPERATURE: 97.5 F | HEART RATE: 66 BPM | SYSTOLIC BLOOD PRESSURE: 152 MMHG | BODY MASS INDEX: 29.89 KG/M2

## 2025-03-25 DIAGNOSIS — K90.9 INTESTINAL MALABSORPTION, UNSPECIFIED TYPE: Primary | ICD-10-CM

## 2025-03-25 DIAGNOSIS — D50.9 IRON DEFICIENCY ANEMIA, UNSPECIFIED IRON DEFICIENCY ANEMIA TYPE: ICD-10-CM

## 2025-03-25 PROCEDURE — 96374 THER/PROPH/DIAG INJ IV PUSH: CPT

## 2025-03-25 PROCEDURE — 96365 THER/PROPH/DIAG IV INF INIT: CPT

## 2025-03-25 PROCEDURE — 25010000002 FERUMOXYTOL 510 MG/17ML SOLUTION: Performed by: NURSE PRACTITIONER

## 2025-03-25 RX ADMIN — FERUMOXYTOL 510 MG: 510 INJECTION INTRAVENOUS at 10:05

## 2025-04-01 ENCOUNTER — INFUSION (OUTPATIENT)
Dept: ONCOLOGY | Facility: HOSPITAL | Age: 50
End: 2025-04-01
Payer: COMMERCIAL

## 2025-04-01 VITALS
OXYGEN SATURATION: 99 % | SYSTOLIC BLOOD PRESSURE: 142 MMHG | HEART RATE: 68 BPM | WEIGHT: 172.8 LBS | BODY MASS INDEX: 29.65 KG/M2 | TEMPERATURE: 97.7 F | RESPIRATION RATE: 18 BRPM | DIASTOLIC BLOOD PRESSURE: 84 MMHG

## 2025-04-01 DIAGNOSIS — K90.9 INTESTINAL MALABSORPTION, UNSPECIFIED TYPE: Primary | ICD-10-CM

## 2025-04-01 DIAGNOSIS — D50.9 IRON DEFICIENCY ANEMIA, UNSPECIFIED IRON DEFICIENCY ANEMIA TYPE: ICD-10-CM

## 2025-04-01 PROCEDURE — 25010000002 FERUMOXYTOL 510 MG/17ML SOLUTION: Performed by: NURSE PRACTITIONER

## 2025-04-01 PROCEDURE — 96365 THER/PROPH/DIAG IV INF INIT: CPT

## 2025-04-01 PROCEDURE — 96374 THER/PROPH/DIAG INJ IV PUSH: CPT

## 2025-04-01 RX ADMIN — FERUMOXYTOL 510 MG: 510 INJECTION INTRAVENOUS at 14:50

## 2025-05-06 ENCOUNTER — TRANSCRIBE ORDERS (OUTPATIENT)
Dept: ADMINISTRATIVE | Facility: HOSPITAL | Age: 50
End: 2025-05-06
Payer: COMMERCIAL

## 2025-05-06 DIAGNOSIS — N28.1 RENAL CYST: Primary | ICD-10-CM

## 2025-05-31 ENCOUNTER — HOSPITAL ENCOUNTER (OUTPATIENT)
Dept: CT IMAGING | Facility: HOSPITAL | Age: 50
Discharge: HOME | End: 2025-05-31
Payer: COMMERCIAL

## 2025-05-31 DIAGNOSIS — N28.1 RENAL CYST: ICD-10-CM

## 2025-07-03 ENCOUNTER — HOSPITAL ENCOUNTER (OUTPATIENT)
Dept: CT IMAGING | Facility: HOSPITAL | Age: 50
Discharge: HOME | End: 2025-07-03
Admitting: PHYSICIAN ASSISTANT
Payer: COMMERCIAL

## 2025-07-03 PROCEDURE — 25510000001 IOPAMIDOL 61 % SOLUTION: Performed by: PHYSICIAN ASSISTANT

## 2025-07-03 PROCEDURE — 74170 CT ABD WO CNTRST FLWD CNTRST: CPT

## 2025-07-03 RX ORDER — IOPAMIDOL 612 MG/ML
100 INJECTION, SOLUTION INTRAVASCULAR
Status: COMPLETED | OUTPATIENT
Start: 2025-07-03 | End: 2025-07-03

## 2025-07-03 RX ADMIN — IOPAMIDOL 100 ML: 612 INJECTION, SOLUTION INTRAVENOUS at 10:53

## 2025-07-23 ENCOUNTER — OFFICE VISIT (OUTPATIENT)
Dept: ONCOLOGY | Facility: CLINIC | Age: 50
End: 2025-07-23
Payer: COMMERCIAL

## 2025-07-23 VITALS
HEIGHT: 64 IN | SYSTOLIC BLOOD PRESSURE: 141 MMHG | OXYGEN SATURATION: 100 % | DIASTOLIC BLOOD PRESSURE: 96 MMHG | WEIGHT: 174.4 LBS | RESPIRATION RATE: 16 BRPM | TEMPERATURE: 97.3 F | HEART RATE: 64 BPM | BODY MASS INDEX: 29.78 KG/M2

## 2025-07-23 DIAGNOSIS — D50.9 IRON DEFICIENCY ANEMIA, UNSPECIFIED IRON DEFICIENCY ANEMIA TYPE: ICD-10-CM

## 2025-07-23 DIAGNOSIS — E53.8 FOLATE DEFICIENCY: ICD-10-CM

## 2025-07-23 DIAGNOSIS — K90.9 INTESTINAL MALABSORPTION, UNSPECIFIED TYPE: ICD-10-CM

## 2025-07-23 DIAGNOSIS — E53.8 B12 DEFICIENCY: Primary | ICD-10-CM

## 2025-07-23 LAB
BASOPHILS # BLD AUTO: 0.02 10*3/MM3 (ref 0–0.2)
BASOPHILS NFR BLD AUTO: 0.4 % (ref 0–1.5)
DEPRECATED RDW RBC AUTO: 40.6 FL (ref 37–54)
EOSINOPHIL # BLD AUTO: 0.15 10*3/MM3 (ref 0–0.4)
EOSINOPHIL NFR BLD AUTO: 2.8 % (ref 0.3–6.2)
ERYTHROCYTE [DISTWIDTH] IN BLOOD BY AUTOMATED COUNT: 13.3 % (ref 12.3–15.4)
FERRITIN SERPL-MCNC: 201 NG/ML (ref 13–150)
HCT VFR BLD AUTO: 40.2 % (ref 34–46.6)
HGB BLD-MCNC: 12.3 G/DL (ref 12–15.9)
IMM GRANULOCYTES # BLD AUTO: 0.01 10*3/MM3 (ref 0–0.05)
IMM GRANULOCYTES NFR BLD AUTO: 0.2 % (ref 0–0.5)
IRON 24H UR-MRATE: 61 MCG/DL (ref 37–145)
IRON SATN MFR SERPL: 17 % (ref 20–50)
LYMPHOCYTES # BLD AUTO: 1.53 10*3/MM3 (ref 0.7–3.1)
LYMPHOCYTES NFR BLD AUTO: 29 % (ref 19.6–45.3)
MCH RBC QN AUTO: 25.5 PG (ref 26.6–33)
MCHC RBC AUTO-ENTMCNC: 30.6 G/DL (ref 31.5–35.7)
MCV RBC AUTO: 83.2 FL (ref 79–97)
MONOCYTES # BLD AUTO: 0.42 10*3/MM3 (ref 0.1–0.9)
MONOCYTES NFR BLD AUTO: 8 % (ref 5–12)
NEUTROPHILS NFR BLD AUTO: 3.15 10*3/MM3 (ref 1.7–7)
NEUTROPHILS NFR BLD AUTO: 59.6 % (ref 42.7–76)
NRBC BLD AUTO-RTO: 0 /100 WBC (ref 0–0.2)
PLATELET # BLD AUTO: 279 10*3/MM3 (ref 140–450)
PMV BLD AUTO: 9.7 FL (ref 6–12)
RBC # BLD AUTO: 4.83 10*6/MM3 (ref 3.77–5.28)
TIBC SERPL-MCNC: 355 MCG/DL (ref 298–536)
TRANSFERRIN SERPL-MCNC: 238 MG/DL (ref 200–360)
WBC NRBC COR # BLD AUTO: 5.28 10*3/MM3 (ref 3.4–10.8)

## 2025-07-23 PROCEDURE — 99214 OFFICE O/P EST MOD 30 MIN: CPT | Performed by: NURSE PRACTITIONER

## 2025-07-23 PROCEDURE — 84466 ASSAY OF TRANSFERRIN: CPT | Performed by: NURSE PRACTITIONER

## 2025-07-23 PROCEDURE — 82728 ASSAY OF FERRITIN: CPT | Performed by: NURSE PRACTITIONER

## 2025-07-23 PROCEDURE — 85025 COMPLETE CBC W/AUTO DIFF WBC: CPT | Performed by: NURSE PRACTITIONER

## 2025-07-23 PROCEDURE — 83540 ASSAY OF IRON: CPT | Performed by: NURSE PRACTITIONER

## 2025-07-23 RX ORDER — FAMOTIDINE 20 MG/1
1 TABLET, FILM COATED ORAL EVERY 12 HOURS SCHEDULED
COMMUNITY
Start: 2025-06-23

## 2025-07-23 NOTE — PROGRESS NOTES
Venipuncture Blood Specimen Collection  Venipuncture performed in Right Arm by Dominic Mackenzie MA with good hemostasis. Patient tolerated the procedure well without complications.   07/23/25   Dominic Mackenzie MA

## 2025-07-23 NOTE — PROGRESS NOTES
DATE:  7/23/2025    DIAGNOSIS: AURY    CHIEF COMPLAINT:  None.      TREATMENT HISTORY:  Feraheme Day 1 06/04/2021  Day 8 06/10/2021  Feraheme Day 1 04/04/2022  Day 8 04/11/2022  Feraheme Day 1 06/01/2023  Day 8 06/08/2023  Feraheme Day 1 08/08/2024  Day 8 08/15/2024  Feraheme Day 1 03/25/2025  Day 8 04/01/2025    B12 2500 mcg SL daily   Folic Acid 1 mg daily    HISTORY OF PRESENT ILLNESS:   Faby Avelar is a very pleasant 49 y.o. female who is being seen today at the request of Dorina Ball MD for evaluation and treatment of iron deficiency anemia. Ms. Avelar reports following with her PCP every 6 months with routine lab testing. Previous available CBCs were reviewed and noted microyctic anemia, Hg 11.5, in December 2020 and Hg 10.3 in April 2021. Her WBC and platelets were normal. There are no other CBCs available to review for comparison of trend. She reports that she has always struggled with AURY and has been on and off oral iron multiple times in the past. She reports today that she has been on oral iron for the last two years and has not been fully compliant due to abdominal pain, nausea and constipation caused by the supplement. She reports fatigue, shortness of breath on exertion and occasional dizziness. She reports that her periods are normal. However, she occasionally struggles with menorrhagia. She follows with GYN and has recommended annual exams. She otherwise, denies blood loss from any source, no melena, rectal bleeding or hematuria. She reports having EGD/colonoscopy in 2006 with Dr. Mak which was unremarkable. She denies history of blood transfusions. She denies any other specific complaints today.    Interval History:  Ms. Avelar presents today for follow up of AURY. She was last replaced with IV Feraheme in April 2025. She is taking folic acid daily and B12 SL daily and tolerating this well. She reports improved energy level. Denies any worsening fatigue, chest pain or dizziness. She  reports normal menstrual periods. Otherwise, she denies blood loss from any other source. She denies any other specific complaints today.     The following portions of the patient's history were reviewed and updated as appropriate: allergies, current medications, past family history, past medical history, past social history, past surgical history and problem list.    PAST MEDICAL HISTORY:  Past Medical History:   Diagnosis Date    Diabetes mellitus     Elevated cholesterol     Heartburn     Hypertension     Low iron     Thyroid activity decreased        PAST SURGICAL HISTORY:  Past Surgical History:   Procedure Laterality Date    BREAST BIOPSY Right 10/29/2020    Procedure: BREAST BIOPSY WITH NEEDLE LOCALIZATION;  Surgeon: Dao Cano MD;  Location: [facility];  Service: General;  Laterality: Right;    GALLBLADDER SURGERY      WRIST SURGERY         SOCIAL HISTORY:  Social History     Socioeconomic History    Marital status:    Tobacco Use    Smoking status: Never    Smokeless tobacco: Never   Vaping Use    Vaping status: Never Used   Substance and Sexual Activity    Alcohol use: Never    Drug use: Never    Sexual activity: Defer           FAMILY HISTORY:  Family History   Problem Relation Age of Onset    COPD Mother     Hypertension Mother     Skin cancer Mother     Heart disease Father     Diabetes Father     Hyperlipidemia Father          MEDICATIONS:  The current medication list was reviewed in the EMR    Current Outpatient Medications:     cetirizine (zyrTEC) 5 MG tablet, Take 1 tablet by mouth Daily., Disp: , Rfl:     cyanocobalamin (VITAMIN B-12) 2500 MCG tablet tablet, Take 1 tablet by mouth Daily., Disp: 30 tablet, Rfl: 5    famotidine (PEPCID) 20 MG tablet, Take 1 tablet by mouth Every 12 (Twelve) Hours., Disp: , Rfl:     folic acid (FOLVITE) 1 MG tablet, Take 1 tablet by mouth Daily., Disp: 30 tablet, Rfl: 5    gabapentin (NEURONTIN) 300 MG capsule, TAKE 1 (ONE) CAPSULE BY MOUTH AT  BEDTIME, Disp: , Rfl:     irbesartan (AVAPRO) 150 MG tablet, Take 1 tablet by mouth Daily., Disp: , Rfl:     levothyroxine (SYNTHROID, LEVOTHROID) 75 MCG tablet, Take 1 tablet by mouth Daily., Disp: , Rfl:     montelukast (SINGULAIR) 10 MG tablet, Take 1 tablet by mouth Every Night., Disp: , Rfl:     rosuvastatin (CRESTOR) 5 MG tablet, Take 1 tablet by mouth Daily., Disp: , Rfl:     Rybelsus 14 MG tablet, Take 1 tablet by mouth Daily., Disp: , Rfl:     vitamin D (ERGOCALCIFEROL) 1.25 MG (73458 UT) capsule capsule, Take 1 capsule by mouth Daily., Disp: , Rfl:     acetaminophen (TYLENOL) 325 MG tablet, Take 2 tablets by mouth Every 4 (Four) Hours As Needed for Mild Pain ., Disp: 30 tablet, Rfl: 0    fenofibrate (TRICOR) 145 MG tablet, Take 1 tablet by mouth Daily. (Patient not taking: Reported on 7/31/2024), Disp: , Rfl:     Ferrex 150 150 MG capsule, Take 1 capsule by mouth Daily As Needed. (Patient not taking: Reported on 7/31/2024), Disp: , Rfl:     ibuprofen (ADVIL,MOTRIN) 600 MG tablet, Take 1 tablet by mouth Every 6 (Six) Hours As Needed for Mild Pain ., Disp: 30 tablet, Rfl: 0    Jardiance 25 MG tablet, Take 1 tablet by mouth Daily., Disp: , Rfl:     levocetirizine (XYZAL) 5 MG tablet, Take 1 tablet by mouth Every Evening. (Patient not taking: Reported on 7/31/2024), Disp: , Rfl:     Ozempic, 2 MG/DOSE, 8 MG/3ML solution pen-injector, INJECT 2MG UNDER THE SKIN ONCE WEEKLY, Disp: , Rfl:     traMADol (ULTRAM) 50 MG tablet, Take 1 tablet by mouth Every 8 (Eight) Hours As Needed for Moderate Pain ., Disp: 6 tablet, Rfl: 0    ALLERGIES:    Allergies   Allergen Reactions    Mushroom Other (See Comments)     PT REPORTS THROAT TINGLES AND MOUTH GET NUMB         REVIEW OF SYSTEMS:    A comprehensive 14 point review of systems was performed.  Significant findings as mentioned above.  All other systems reviewed and are negative.        Physical Exam   Vital Signs:   Vitals:    07/23/25 0808   BP: 141/96   Pulse: 64    Resp: 16   Temp: 97.3 °F (36.3 °C)   SpO2: 100%          Patient's Body mass index is 29.92 kg/m².     ECOG score: 0   General: Well developed, well nourished, alert and oriented x 3, in no acute distress.   Head: ATNC   Eyes: PERRL, No evidence of conjunctivitis.   Nose: No nasal discharge.   Mouth: Oral mucosal membranes moist. No oral ulceration or hemorrhages.   Neck: Neck supple. No thyromegaly. No JVD.   Lungs: Clear in all fields to A&P without rales, rhonchi or wheezing.   Heart: S1, S2. Regular rate and rhythm. No murmurs, rubs, or gallops.   Abdomen: Soft. Bowel sounds are normoactive. Nontender with palpation. No Hepatosplenomegaly can be appreciated.   Extremities: No clubbing, cyanosis or edema.   Integumentary: Warm, dry, intact.   Neurologic: Grossly non-focal exam.    Pain Score:  Pain Score    07/23/25 0808   PainSc: 0-No pain             PHQ-Score Total:  PHQ-9 Total Score:         IMAGING:        RECENT LABS:  Lab Results   Component Value Date    WBC 5.28 07/23/2025    HGB 12.3 07/23/2025    HCT 40.2 07/23/2025    MCV 83.2 07/23/2025    RDW 13.3 07/23/2025     07/23/2025    NEUTRORELPCT 59.6 07/23/2025    LYMPHORELPCT 29.0 07/23/2025    MONORELPCT 8.0 07/23/2025    EOSRELPCT 2.8 07/23/2025    BASORELPCT 0.4 07/23/2025    NEUTROABS 3.15 07/23/2025    LYMPHSABS 1.53 07/23/2025       Lab Results   Component Value Date     05/03/2021    K 4.3 05/03/2021    CO2 23.6 05/03/2021     05/03/2021    BUN 11 05/03/2021    CREATININE 0.88 05/03/2021    EGFRIFNONA 70 05/03/2021    EGFRIFAFRI 125 01/29/2015    GLUCOSE 81 05/03/2021    CALCIUM 9.7 05/03/2021    ALKPHOS 34 (L) 05/03/2021    AST 24 05/03/2021    ALT 23 05/03/2021    BILITOT 0.3 05/03/2021    ALBUMIN 4.0 09/27/2021    PROTEINTOT 6.9 09/27/2021       Lab Results   Component Value Date     05/03/2021       Lab Results   Component Value Date    FERRITIN 201.00 (H) 07/23/2025    IRON 61 07/23/2025    TIBC 355 07/23/2025     "LABIRON 17 (L) 07/23/2025    MXRTZVPS45 517 03/18/2025    FOLATE 17.70 03/18/2025    HAPTOGLOBIN 120 05/03/2021    RETICCTPCT 0.90 05/03/2021    RETIC 0.0441 05/03/2021        Work up 05/03/2021      Sed Rate   0 - 20 mm/hr 8      C-Reactive Protein   0.00 - 0.50 mg/dL 0.35      Copper   80 - 158 ug/dL 139      Zinc   44 - 115 ug/dL 84      Tissue Transglutaminase IgA   0 - 3 U/mL <2          ASSESSMENT & PLAN:  Faby Avelar is a very pleasant 49 y.o. female with    1.Microcytic Anemia  2. AURY  3. B12 deficiency  4. Folate deficiency  - Previous available CBCs were reviewed and noted microyctic anemia, Hg 11.5, in December 2020 and Hg 10.3 in April 2021. Her WBC and platelets were normal. There are no other CBCs available to review for comparison of trend. She reports that she has always struggled with AURY and has been on and off oral iron multiple times in the past. She reports today that she has been on oral iron for the last two years and has not been fully compliant due to abdominal pain, nausea and constipation caused by the supplement.   - She reports that her periods are normal. However, she occasionally struggles with menorrhagia. She follows with GYN and has recommended annual exams.  - Denies blood loss from any source, no melena, rectal bleeding or hematuria. She reports having EGD/colonoscopy in 2006 with Dr. Mak which was unremarkable.   - She denies history of blood transfusions.  - CBC from initial consultation showed Hg 10.7. Iron panel and ferritin were low and most consistent with iron deficiency anemia and anemia of chronic disease/inflammation. Therefore, discontinued oral iron and replaced with IV Feraheme in June 2021.   - Also sent additional labs to further evaluate including PBS (summarized above) which showed microcytic, hypochromic anemia with anisocytosis including \"pencil\" cells, compatible with the provided history of AURY, normal WBC and differential, mature WBC morphology with no " granulocytic dysplasia or blasts identified, adequate platelets. B12 and folate were low and she was started on B12 SL 2500 mcg daily and folic acid 1 mg daily. No evidence of hemolysis as Retic, LDH and Haptoglobin were normal. ESR and CRP normal.  Copper, zinc and tissue transglutaminase were normal. To further evaluate anemia obtained serum LINDA which showed no M-spike and serum free light chains revealed normal kappa/lambda ratio.  - Cologuard testing per PCP in January 2022 was negative per patient. Repeat Cologuard testing in December 2024 was unremarkable per patient.   - She was last replaced with IV Feraheme in April 2025.  - Repeat CBC from today is showing normalized Hg ~12.3. Iron is normal with low iron saturation and elevated Ferritin.  - B12 and Folate levels from March 2025 were replete. Advised to continue Folic Acid 1 mg and B12 2500 mcg SL daily.   - Will follow up in 4 months with CBC, Iron panel and Ferritin. Will replace with IV Feraheme as needed. She is aware to notify clinic in the interim, if she were to develop worsening fatigue, shortness of breath on exertion, chest pain or dizziness.        ACO / TY/Other  Quality measures  -  Faby Avelar received 2024 flu vaccine.  -  Faby Avelar reports a pain score of 0.   -  Current outpatient and discharge medications have been reconciled for the patient.  Reviewed by: JOHNY Ivan                I spent 30 minutes caring for Faby on this date of service. This time includes time spent by me in the following activities: preparing for the visit, reviewing tests, obtaining and/or reviewing a separately obtained history, performing a medically appropriate examination and/or evaluation, counseling and educating the patient/family/caregiver, ordering medications, tests, or procedures, documenting information in the medical record, independently interpreting results and communicating that information with the patient/family/caregiver,  and care coordination.                                       Electronically Signed by: JOHNY Restrepo , JOHNY July 23, 2025 09:08 EDT       CC:   No ref. provider found  Zoë Multani PA

## (undated) DEVICE — PK BASIC 70

## (undated) DEVICE — DRAPE,UTILTY,TAPE,15X26, 4EA/PK: Brand: MEDLINE

## (undated) DEVICE — APPL CHLORAPREP HI/LITE 26ML ORNG

## (undated) DEVICE — SKIN AFFIX SURG ADHESIVE 72/CS 0.55ML: Brand: MEDLINE

## (undated) DEVICE — SUT SILK 2/0 FS BLK 18IN 685G

## (undated) DEVICE — GLV SURG PREMIERPRO MIC LTX PF SZ8 BRN

## (undated) DEVICE — GLV SURG SENSICARE W/ALOE PF LF 7.5 STRL

## (undated) DEVICE — PATIENT RETURN ELECTRODE, SINGLE-USE, CONTACT QUALITY MONITORING, ADULT, WITH 9FT CORD, FOR PATIENTS WEIGING OVER 33LBS. (15KG): Brand: MEGADYNE

## (undated) DEVICE — HOLDER: Brand: DEROYAL

## (undated) DEVICE — DEV TRNSPEC

## (undated) DEVICE — SUT MNCRYL 4/0 PS2 18 IN

## (undated) DEVICE — DBD-DRAPE,LAP,CHOLE,W/TROUGHS,STERILE: Brand: MEDLINE